# Patient Record
Sex: FEMALE | Race: WHITE | Employment: OTHER | URBAN - METROPOLITAN AREA
[De-identification: names, ages, dates, MRNs, and addresses within clinical notes are randomized per-mention and may not be internally consistent; named-entity substitution may affect disease eponyms.]

---

## 2017-10-18 PROBLEM — G89.4 CHRONIC PAIN SYNDROME: Status: ACTIVE | Noted: 2017-10-18

## 2018-05-23 ENCOUNTER — HOSPITAL ENCOUNTER (OUTPATIENT)
Age: 63
Setting detail: OUTPATIENT SURGERY
Discharge: HOME | End: 2018-05-23
Attending: ANESTHESIOLOGY | Admitting: ANESTHESIOLOGY
Payer: COMMERCIAL

## 2018-05-23 VITALS
OXYGEN SATURATION: 100 % | DIASTOLIC BLOOD PRESSURE: 60 MMHG | HEART RATE: 76 BPM | RESPIRATION RATE: 14 BRPM | HEIGHT: 64 IN | WEIGHT: 159 LBS | SYSTOLIC BLOOD PRESSURE: 100 MMHG | BODY MASS INDEX: 27.14 KG/M2

## 2018-05-23 DIAGNOSIS — R52 PAIN: ICD-10-CM

## 2018-05-23 PROBLEM — M96.1 LUMBAR POST-LAMINECTOMY SYNDROME: Chronic | Status: ACTIVE | Noted: 2018-05-23

## 2018-05-23 PROCEDURE — 7100000011 HC PHASE II RECOVERY - ADDTL 15 MIN: Performed by: ANESTHESIOLOGY

## 2018-05-23 PROCEDURE — 6370000000 HC RX 637 (ALT 250 FOR IP): Performed by: ANESTHESIOLOGY

## 2018-05-23 PROCEDURE — 3600000015 HC SURGERY LEVEL 5 ADDTL 15MIN: Performed by: ANESTHESIOLOGY

## 2018-05-23 PROCEDURE — 7100000010 HC PHASE II RECOVERY - FIRST 15 MIN: Performed by: ANESTHESIOLOGY

## 2018-05-23 PROCEDURE — 3600000005 HC SURGERY LEVEL 5 BASE: Performed by: ANESTHESIOLOGY

## 2018-05-23 PROCEDURE — 2580000003 HC RX 258: Performed by: ANESTHESIOLOGY

## 2018-05-23 PROCEDURE — 6360000002 HC RX W HCPCS: Performed by: ANESTHESIOLOGY

## 2018-05-23 PROCEDURE — 2500000003 HC RX 250 WO HCPCS: Performed by: ANESTHESIOLOGY

## 2018-05-23 RX ORDER — 0.9 % SODIUM CHLORIDE 0.9 %
VIAL (ML) INJECTION PRN
Status: DISCONTINUED | OUTPATIENT
Start: 2018-05-23 | End: 2018-05-23 | Stop reason: HOSPADM

## 2018-05-23 RX ORDER — SODIUM CHLORIDE, SODIUM LACTATE, POTASSIUM CHLORIDE, CALCIUM CHLORIDE 600; 310; 30; 20 MG/100ML; MG/100ML; MG/100ML; MG/100ML
INJECTION, SOLUTION INTRAVENOUS CONTINUOUS
Status: DISCONTINUED | OUTPATIENT
Start: 2018-05-23 | End: 2018-05-23 | Stop reason: HOSPADM

## 2018-05-23 RX ORDER — FENTANYL CITRATE 50 UG/ML
INJECTION, SOLUTION INTRAMUSCULAR; INTRAVENOUS PRN
Status: DISCONTINUED | OUTPATIENT
Start: 2018-05-23 | End: 2018-05-23 | Stop reason: HOSPADM

## 2018-05-23 RX ORDER — DIPHENHYDRAMINE HCL 25 MG
25 TABLET ORAL ONCE
Status: COMPLETED | OUTPATIENT
Start: 2018-05-23 | End: 2018-05-23

## 2018-05-23 RX ORDER — LIDOCAINE HYDROCHLORIDE 10 MG/ML
INJECTION, SOLUTION EPIDURAL; INFILTRATION; INTRACAUDAL; PERINEURAL PRN
Status: DISCONTINUED | OUTPATIENT
Start: 2018-05-23 | End: 2018-05-23 | Stop reason: HOSPADM

## 2018-05-23 RX ADMIN — SODIUM CHLORIDE, POTASSIUM CHLORIDE, SODIUM LACTATE AND CALCIUM CHLORIDE: 600; 310; 30; 20 INJECTION, SOLUTION INTRAVENOUS at 07:15

## 2018-05-23 RX ADMIN — DIPHENHYDRAMINE HCL 25 MG: 25 TABLET ORAL at 08:36

## 2018-05-23 ASSESSMENT — PAIN SCALES - GENERAL
PAINLEVEL_OUTOF10: 0

## 2018-05-23 ASSESSMENT — PAIN DESCRIPTION - DESCRIPTORS: DESCRIPTORS: ACHING

## 2018-05-23 ASSESSMENT — PAIN - FUNCTIONAL ASSESSMENT: PAIN_FUNCTIONAL_ASSESSMENT: 0-10

## 2018-07-26 RX ORDER — PREGABALIN 200 MG/1
75 CAPSULE ORAL 2 TIMES DAILY
COMMUNITY

## 2018-07-26 RX ORDER — TRAMADOL HYDROCHLORIDE 200 MG/1
200 CAPSULE ORAL DAILY
COMMUNITY

## 2018-07-26 RX ORDER — TRAMADOL HYDROCHLORIDE 50 MG/1
50 TABLET ORAL 2 TIMES DAILY PRN
COMMUNITY

## 2018-07-27 ENCOUNTER — ANESTHESIA EVENT (OUTPATIENT)
Dept: OPERATING ROOM | Age: 63
End: 2018-07-27
Payer: COMMERCIAL

## 2018-07-31 ASSESSMENT — LIFESTYLE VARIABLES: SMOKING_STATUS: 0

## 2018-07-31 NOTE — ANESTHESIA PRE PROCEDURE
Department of Anesthesiology  Preprocedure Note       Name:  Jefe Pyle   Age:  63 y.o.  :  1955                                          MRN:  [id number]         Date:  2018      Surgeon: Ej Bernard):  Juan Shah DO    Procedure: Procedure(s):  SURGICAL IMPLANTATION OF MEDTRONIC DRUG INFUSION PUMP    Medications prior to admission:   Prior to Admission medications    Medication Sig Start Date End Date Taking? Authorizing Provider   traMADol (ULTRAM) 50 MG tablet Take 50 mg by mouth 2 times daily as needed for Pain. .   Yes Historical Provider, MD   traMADol [employer] [facility]) 200 MG extended release tablet Take 200 mg by mouth daily. .   Yes Historical Provider, MD   pregabalin (LYRICA) 200 MG capsule Take 200 mg by mouth 2 times daily. .   Yes Historical Provider, MD   lidocaine (XYLOCAINE) 5 % ointment Apply topically as needed for pain up to 3 times a day to affected area. 18   Justin Claros MD   lidocaine (LIDODERM) 5 % APPLY three PATCH's TO THREE AFFECTED AREAS 12 HOURS ON 12 HOURS OFF. 18  Justin Claros MD   ketoconazole (NIZORAL) 2 % cream Apply topically 2 times daily Apply topically daily. Historical Provider, MD   Golimumab ([address]) 50 MG/0.5ML SOSY Inject into the skin monthy    Historical Provider, MD   Turmeric Curcumin 500 MG CAPS Take 1 capsule by mouth    Historical Provider, MD   MILK THISTLE PO Take 2 tablets by mouth daily    Historical Provider, MD   methotrexate (RHEUMATREX) 2.5 MG chemo tablet   Take 2.5 mg by mouth once a week Take 10 tabs on Friday nights total dose 20 mg. Historical Provider, MD   exemestane (AROMASIN) 25 MG chemo tablet Take 25 mg by mouth daily. Historical Provider, MD   folic acid (FOLVITE) 1 MG tablet Take 1 mg by mouth 2 times daily     Historical Provider, MD   magnesium gluconate (MAGONATE) 500 MG tablet Take 250 mg by mouth Takes 1-2 daily    Historical Provider, MD   Coenzyme Q10 (CO Q-10) 100 MG CAPS Take  by mouth daily. Mesh    TUBAL LIGATION      UPPER GASTROINTESTINAL ENDOSCOPY         Social History:    Social History   Substance Use Topics    Smoking status: Former Smoker     Years: 30.00     Types: Cigarettes     Quit date: 2/26/2004    Smokeless tobacco: Never Used    Alcohol use Yes      Comment: 1 glass red wine  4 days pe week                                Counseling given: Not Answered      Vital Signs (Current):   Vitals:    07/26/18 0956   Weight: 152 lb (68.9 kg)   Height: 5' 5\" (1.651 m)                                              BP Readings from Last 3 Encounters:   05/23/18 100/60   01/26/18 (!) 100/58   12/29/17 100/60       NPO Status:  >8.H                                                                               BMI:   Wt Readings from Last 3 Encounters:   05/23/18 159 lb (72.1 kg)   01/11/18 157 lb (71.2 kg)   11/06/17 162 lb (73.5 kg)     Body mass index is 25.29 kg/m². CBC:   Lab Results   Component Value Date    WBC 5.5 01/21/2015    RBC 3.69 01/21/2015    HGB 12.1 01/21/2015    HCT 35.8 01/21/2015    MCV 97.0 01/21/2015    RDW 13.5 01/21/2015     01/21/2015       CMP:   Lab Results   Component Value Date     01/21/2015    K 4.3 01/21/2015     01/21/2015    CO2 29 01/21/2015    BUN 17 04/25/2017    CREATININE 0.8 04/25/2017    GFRAA >60 04/25/2017    LABGLOM >60 04/25/2017    GLUCOSE 96 01/21/2015    GLUCOSE 91 03/20/2012    PROT 6.7 04/25/2017    CALCIUM 9.5 01/21/2015    BILITOT 0.4 04/25/2017    ALKPHOS 67 04/25/2017    AST 23 04/25/2017    ALT 17 04/25/2017       POC Tests: No results for input(s): POCGLU, POCNA, POCK, POCCL, POCBUN, POCHEMO, POCHCT in the last 72 hours.     Coags:   Lab Results   Component Value Date    PROTIME 13.6 03/20/2012    INR 1.2 03/20/2012    APTT 37.6 03/20/2012       HCG (If Applicable):   Lab Results   Component Value Date    PREGSERUM NEGATIVE 03/20/2012        ABGs: No results found for: PHART, PO2ART, JSI2LIM, HTS9JJU, BEART, G3BBKBQJ Type & Screen (If Applicable):  No results found for: [facility]    Anesthesia Evaluation  Patient summary reviewed history of anesthetic complications (\"anaphylaxis\"? with Demerol): Airway: Mallampati: II  TM distance: >3 FB   Neck ROM: full  Mouth opening: > = 3 FB Dental: normal exam     Comment: fillings    Pulmonary: breath sounds clear to auscultation      (-) not a current smoker (ex 30 yr smoker)                           Cardiovascular:  Exercise tolerance: good (>4 METS),         ECG reviewed  Rhythm: regular  Rate: normal    Stress test reviewed             ROS comment: Recent EKG=NSR    Stress test 2015=IMPRESSION:    1. Myocardial perfusion SPECT, within normal limits. 2. Stress induced ischemia is not demonstrated. Neuro/Psych:   (+) neuromuscular disease:, headaches: migraine headaches,             GI/Hepatic/Renal:   (+) PUD,           Endo/Other:    (+) hypothyroidism: arthritis: rheumatoid and OA., .                  ROS comment: CA tonsil, head and neck, skin, breast Abdominal:         (-) obese     Vascular:                                    Anesthesia Plan      general     ASA 3       Induction: intravenous. BIS  MIPS: Postoperative opioids intended and Prophylactic antiemetics administered. Anesthetic plan and risks discussed with patient. Plan discussed with CRNA.               Jojo Barron MD   7/31/2018

## 2018-08-01 ENCOUNTER — HOSPITAL ENCOUNTER (OUTPATIENT)
Dept: OPERATING ROOM | Age: 63
Setting detail: OUTPATIENT SURGERY
Discharge: HOME | End: 2018-08-01
Attending: ANESTHESIOLOGY
Payer: COMMERCIAL

## 2018-08-01 ENCOUNTER — HOSPITAL ENCOUNTER (OUTPATIENT)
Age: 63
Setting detail: OUTPATIENT SURGERY
Discharge: HOME | End: 2018-08-01
Attending: ANESTHESIOLOGY | Admitting: ANESTHESIOLOGY
Payer: COMMERCIAL

## 2018-08-01 ENCOUNTER — ANESTHESIA (OUTPATIENT)
Dept: OPERATING ROOM | Age: 63
End: 2018-08-01
Payer: COMMERCIAL

## 2018-08-01 VITALS
SYSTOLIC BLOOD PRESSURE: 134 MMHG | RESPIRATION RATE: 14 BRPM | HEIGHT: 65 IN | TEMPERATURE: 98 F | WEIGHT: 157 LBS | BODY MASS INDEX: 26.16 KG/M2 | HEART RATE: 64 BPM | OXYGEN SATURATION: 98 % | DIASTOLIC BLOOD PRESSURE: 64 MMHG

## 2018-08-01 VITALS
OXYGEN SATURATION: 99 % | DIASTOLIC BLOOD PRESSURE: 78 MMHG | SYSTOLIC BLOOD PRESSURE: 110 MMHG | RESPIRATION RATE: 12 BRPM

## 2018-08-01 DIAGNOSIS — M51.36 DDD (DEGENERATIVE DISC DISEASE), LUMBAR: Primary | Chronic | ICD-10-CM

## 2018-08-01 DIAGNOSIS — R52 PAIN: ICD-10-CM

## 2018-08-01 PROCEDURE — 7100000000 HC PACU RECOVERY - FIRST 15 MIN: Performed by: ANESTHESIOLOGY

## 2018-08-01 PROCEDURE — 3600000005 HC SURGERY LEVEL 5 BASE: Performed by: ANESTHESIOLOGY

## 2018-08-01 PROCEDURE — 2580000003 HC RX 258: Performed by: ANESTHESIOLOGY

## 2018-08-01 PROCEDURE — C1751 CATH, INF, PER/CENT/MIDLINE: HCPCS | Performed by: ANESTHESIOLOGY

## 2018-08-01 PROCEDURE — 6370000000 HC RX 637 (ALT 250 FOR IP): Performed by: ANESTHESIOLOGY

## 2018-08-01 PROCEDURE — 6360000002 HC RX W HCPCS: Performed by: ANESTHESIOLOGY

## 2018-08-01 PROCEDURE — 7100000001 HC PACU RECOVERY - ADDTL 15 MIN: Performed by: ANESTHESIOLOGY

## 2018-08-01 PROCEDURE — 3700000000 HC ANESTHESIA ATTENDED CARE: Performed by: ANESTHESIOLOGY

## 2018-08-01 PROCEDURE — 7100000011 HC PHASE II RECOVERY - ADDTL 15 MIN: Performed by: ANESTHESIOLOGY

## 2018-08-01 PROCEDURE — C1755 CATHETER, INTRASPINAL: HCPCS | Performed by: ANESTHESIOLOGY

## 2018-08-01 PROCEDURE — 2500000003 HC RX 250 WO HCPCS: Performed by: NURSE ANESTHETIST, CERTIFIED REGISTERED

## 2018-08-01 PROCEDURE — C1787 PATIENT PROGR, NEUROSTIM: HCPCS | Performed by: ANESTHESIOLOGY

## 2018-08-01 PROCEDURE — 7100000010 HC PHASE II RECOVERY - FIRST 15 MIN: Performed by: ANESTHESIOLOGY

## 2018-08-01 PROCEDURE — 3600000015 HC SURGERY LEVEL 5 ADDTL 15MIN: Performed by: ANESTHESIOLOGY

## 2018-08-01 PROCEDURE — 2500000003 HC RX 250 WO HCPCS: Performed by: ANESTHESIOLOGY

## 2018-08-01 PROCEDURE — 6360000002 HC RX W HCPCS: Performed by: NURSE ANESTHETIST, CERTIFIED REGISTERED

## 2018-08-01 PROCEDURE — C1772 INFUSION PUMP, PROGRAMMABLE: HCPCS | Performed by: ANESTHESIOLOGY

## 2018-08-01 PROCEDURE — 2709999900 HC NON-CHARGEABLE SUPPLY: Performed by: ANESTHESIOLOGY

## 2018-08-01 PROCEDURE — 3700000001 HC ADD 15 MINUTES (ANESTHESIA): Performed by: ANESTHESIOLOGY

## 2018-08-01 PROCEDURE — 3209999900 FLUORO FOR SURGICAL PROCEDURES

## 2018-08-01 PROCEDURE — L0450 TLSO FLEX TRUNK/THOR PRE OTS: HCPCS | Performed by: ANESTHESIOLOGY

## 2018-08-01 DEVICE — PUMP INFUS THK19.5MM DIA87.5MM 20ML TI PROGRAMMABLE: Type: IMPLANTABLE DEVICE | Site: BUTTOCKS | Status: FUNCTIONAL

## 2018-08-01 DEVICE — CATHETER ITH L86CM ASCENDA SPNL SEG: Type: IMPLANTABLE DEVICE | Site: BUTTOCKS | Status: FUNCTIONAL

## 2018-08-01 RX ORDER — LABETALOL HYDROCHLORIDE 5 MG/ML
5 INJECTION, SOLUTION INTRAVENOUS EVERY 10 MIN PRN
Status: DISCONTINUED | OUTPATIENT
Start: 2018-08-01 | End: 2018-08-01 | Stop reason: HOSPADM

## 2018-08-01 RX ORDER — MIDAZOLAM HYDROCHLORIDE 1 MG/ML
INJECTION INTRAMUSCULAR; INTRAVENOUS PRN
Status: DISCONTINUED | OUTPATIENT
Start: 2018-08-01 | End: 2018-08-01 | Stop reason: SDUPTHER

## 2018-08-01 RX ORDER — TRAMADOL HYDROCHLORIDE 50 MG/1
50 TABLET ORAL EVERY 4 HOURS PRN
Qty: 30 TABLET | Refills: 0 | Status: SHIPPED | OUTPATIENT
Start: 2018-08-01 | End: 2019-11-11

## 2018-08-01 RX ORDER — ONDANSETRON 2 MG/ML
INJECTION INTRAMUSCULAR; INTRAVENOUS PRN
Status: DISCONTINUED | OUTPATIENT
Start: 2018-08-01 | End: 2018-08-01 | Stop reason: SDUPTHER

## 2018-08-01 RX ORDER — FENTANYL CITRATE 50 UG/ML
50 INJECTION, SOLUTION INTRAMUSCULAR; INTRAVENOUS EVERY 5 MIN PRN
Status: DISCONTINUED | OUTPATIENT
Start: 2018-08-01 | End: 2018-08-01 | Stop reason: HOSPADM

## 2018-08-01 RX ORDER — FENTANYL CITRATE 50 UG/ML
INJECTION, SOLUTION INTRAMUSCULAR; INTRAVENOUS PRN
Status: DISCONTINUED | OUTPATIENT
Start: 2018-08-01 | End: 2018-08-01 | Stop reason: SDUPTHER

## 2018-08-01 RX ORDER — PROMETHAZINE HYDROCHLORIDE 25 MG/ML
25 INJECTION, SOLUTION INTRAMUSCULAR; INTRAVENOUS
Status: DISCONTINUED | OUTPATIENT
Start: 2018-08-01 | End: 2018-08-01 | Stop reason: HOSPADM

## 2018-08-01 RX ORDER — METOCLOPRAMIDE 10 MG/1
10 TABLET ORAL ONCE
Status: COMPLETED | OUTPATIENT
Start: 2018-08-01 | End: 2018-08-01

## 2018-08-01 RX ORDER — OXYCODONE HYDROCHLORIDE AND ACETAMINOPHEN 5; 325 MG/1; MG/1
2 TABLET ORAL EVERY 4 HOURS PRN
Status: DISCONTINUED | OUTPATIENT
Start: 2018-08-01 | End: 2018-08-01 | Stop reason: HOSPADM

## 2018-08-01 RX ORDER — SODIUM CHLORIDE, SODIUM LACTATE, POTASSIUM CHLORIDE, CALCIUM CHLORIDE 600; 310; 30; 20 MG/100ML; MG/100ML; MG/100ML; MG/100ML
INJECTION, SOLUTION INTRAVENOUS CONTINUOUS
Status: DISCONTINUED | OUTPATIENT
Start: 2018-08-01 | End: 2018-08-01 | Stop reason: HOSPADM

## 2018-08-01 RX ORDER — DEXAMETHASONE SODIUM PHOSPHATE 10 MG/ML
INJECTION, SOLUTION INTRAMUSCULAR; INTRAVENOUS PRN
Status: DISCONTINUED | OUTPATIENT
Start: 2018-08-01 | End: 2018-08-01 | Stop reason: SDUPTHER

## 2018-08-01 RX ORDER — GLYCOPYRROLATE 1 MG/5 ML
SYRINGE (ML) INTRAVENOUS PRN
Status: DISCONTINUED | OUTPATIENT
Start: 2018-08-01 | End: 2018-08-01 | Stop reason: SDUPTHER

## 2018-08-01 RX ORDER — NEOSTIGMINE METHYLSULFATE 1 MG/ML
INJECTION, SOLUTION INTRAVENOUS PRN
Status: DISCONTINUED | OUTPATIENT
Start: 2018-08-01 | End: 2018-08-01 | Stop reason: SDUPTHER

## 2018-08-01 RX ORDER — MORPHINE SULFATE 2 MG/ML
1 INJECTION, SOLUTION INTRAMUSCULAR; INTRAVENOUS EVERY 5 MIN PRN
Status: DISCONTINUED | OUTPATIENT
Start: 2018-08-01 | End: 2018-08-01 | Stop reason: HOSPADM

## 2018-08-01 RX ORDER — CEPHALEXIN 500 MG/1
500 CAPSULE ORAL 3 TIMES DAILY
Qty: 30 CAPSULE | Refills: 0 | Status: SHIPPED | OUTPATIENT
Start: 2018-08-01 | End: 2018-08-11

## 2018-08-01 RX ORDER — ROCURONIUM BROMIDE 10 MG/ML
INJECTION, SOLUTION INTRAVENOUS PRN
Status: DISCONTINUED | OUTPATIENT
Start: 2018-08-01 | End: 2018-08-01 | Stop reason: SDUPTHER

## 2018-08-01 RX ORDER — PROPOFOL 10 MG/ML
INJECTION, EMULSION INTRAVENOUS PRN
Status: DISCONTINUED | OUTPATIENT
Start: 2018-08-01 | End: 2018-08-01 | Stop reason: SDUPTHER

## 2018-08-01 RX ORDER — DIPHENHYDRAMINE HYDROCHLORIDE 50 MG/ML
12.5 INJECTION INTRAMUSCULAR; INTRAVENOUS
Status: DISCONTINUED | OUTPATIENT
Start: 2018-08-01 | End: 2018-08-01 | Stop reason: HOSPADM

## 2018-08-01 RX ORDER — HYDRALAZINE HYDROCHLORIDE 20 MG/ML
5 INJECTION INTRAMUSCULAR; INTRAVENOUS EVERY 10 MIN PRN
Status: DISCONTINUED | OUTPATIENT
Start: 2018-08-01 | End: 2018-08-01 | Stop reason: HOSPADM

## 2018-08-01 RX ORDER — LIDOCAINE HYDROCHLORIDE 20 MG/ML
INJECTION, SOLUTION INFILTRATION; PERINEURAL PRN
Status: DISCONTINUED | OUTPATIENT
Start: 2018-08-01 | End: 2018-08-01 | Stop reason: SDUPTHER

## 2018-08-01 RX ORDER — OXYCODONE HYDROCHLORIDE AND ACETAMINOPHEN 5; 325 MG/1; MG/1
1 TABLET ORAL EVERY 6 HOURS PRN
Qty: 30 TABLET | Refills: 0 | Status: SHIPPED | OUTPATIENT
Start: 2018-08-01 | End: 2018-08-01

## 2018-08-01 RX ADMIN — MIDAZOLAM 2 MG: 1 INJECTION INTRAMUSCULAR; INTRAVENOUS at 08:13

## 2018-08-01 RX ADMIN — PROPOFOL 150 MG: 10 INJECTION, EMULSION INTRAVENOUS at 08:16

## 2018-08-01 RX ADMIN — FENTANYL CITRATE 100 MCG: 50 INJECTION, SOLUTION INTRAMUSCULAR; INTRAVENOUS at 08:42

## 2018-08-01 RX ADMIN — DEXAMETHASONE SODIUM PHOSPHATE 10 MG: 10 INJECTION, SOLUTION INTRAMUSCULAR; INTRAVENOUS at 08:38

## 2018-08-01 RX ADMIN — SODIUM CHLORIDE, POTASSIUM CHLORIDE, SODIUM LACTATE AND CALCIUM CHLORIDE: 600; 310; 30; 20 INJECTION, SOLUTION INTRAVENOUS at 07:26

## 2018-08-01 RX ADMIN — CEFAZOLIN SODIUM 2 G: 2 SOLUTION INTRAVENOUS at 08:05

## 2018-08-01 RX ADMIN — FENTANYL CITRATE 50 MCG: 50 INJECTION, SOLUTION INTRAMUSCULAR; INTRAVENOUS at 08:15

## 2018-08-01 RX ADMIN — ROCURONIUM BROMIDE 30 MG: 10 INJECTION, SOLUTION INTRAVENOUS at 08:16

## 2018-08-01 RX ADMIN — ONDANSETRON HYDROCHLORIDE 4 MG: 2 INJECTION, SOLUTION INTRAMUSCULAR; INTRAVENOUS at 09:24

## 2018-08-01 RX ADMIN — SODIUM CHLORIDE, POTASSIUM CHLORIDE, SODIUM LACTATE AND CALCIUM CHLORIDE: 600; 310; 30; 20 INJECTION, SOLUTION INTRAVENOUS at 09:45

## 2018-08-01 RX ADMIN — ROCURONIUM BROMIDE 5 MG: 10 INJECTION, SOLUTION INTRAVENOUS at 09:05

## 2018-08-01 RX ADMIN — Medication 3 MG: at 09:54

## 2018-08-01 RX ADMIN — LIDOCAINE HYDROCHLORIDE 50 MG: 20 INJECTION, SOLUTION INFILTRATION; PERINEURAL at 08:16

## 2018-08-01 RX ADMIN — Medication 0.6 MG: at 09:54

## 2018-08-01 RX ADMIN — METOCLOPRAMIDE 10 MG: 10 TABLET ORAL at 07:26

## 2018-08-01 ASSESSMENT — PULMONARY FUNCTION TESTS
PIF_VALUE: 17
PIF_VALUE: 16
PIF_VALUE: 16
PIF_VALUE: 15
PIF_VALUE: 13
PIF_VALUE: 20
PIF_VALUE: 18
PIF_VALUE: 16
PIF_VALUE: 18
PIF_VALUE: 18
PIF_VALUE: 17
PIF_VALUE: 18
PIF_VALUE: 19
PIF_VALUE: 17
PIF_VALUE: 16
PIF_VALUE: 17
PIF_VALUE: 24
PIF_VALUE: 17
PIF_VALUE: 18
PIF_VALUE: 20
PIF_VALUE: 16
PIF_VALUE: 15
PIF_VALUE: 3
PIF_VALUE: 1
PIF_VALUE: 16
PIF_VALUE: 16
PIF_VALUE: 17
PIF_VALUE: 18
PIF_VALUE: 16
PIF_VALUE: 17
PIF_VALUE: 18
PIF_VALUE: 17
PIF_VALUE: 16
PIF_VALUE: 18
PIF_VALUE: 17
PIF_VALUE: 0
PIF_VALUE: 18
PIF_VALUE: 16
PIF_VALUE: 15
PIF_VALUE: 4
PIF_VALUE: 18
PIF_VALUE: 17
PIF_VALUE: 16
PIF_VALUE: 16
PIF_VALUE: 19
PIF_VALUE: 17
PIF_VALUE: 17
PIF_VALUE: 16
PIF_VALUE: 17
PIF_VALUE: 16
PIF_VALUE: 20
PIF_VALUE: 16
PIF_VALUE: 17
PIF_VALUE: 18
PIF_VALUE: 16
PIF_VALUE: 23
PIF_VALUE: 16
PIF_VALUE: 17
PIF_VALUE: 20
PIF_VALUE: 17
PIF_VALUE: 17
PIF_VALUE: 1
PIF_VALUE: 16
PIF_VALUE: 17
PIF_VALUE: 16
PIF_VALUE: 20
PIF_VALUE: 17
PIF_VALUE: 17
PIF_VALUE: 5
PIF_VALUE: 17
PIF_VALUE: 18
PIF_VALUE: 17
PIF_VALUE: 17
PIF_VALUE: 18
PIF_VALUE: 20
PIF_VALUE: 18
PIF_VALUE: 17
PIF_VALUE: 17
PIF_VALUE: 7
PIF_VALUE: 16
PIF_VALUE: 15
PIF_VALUE: 17
PIF_VALUE: 17
PIF_VALUE: 16
PIF_VALUE: 17
PIF_VALUE: 18
PIF_VALUE: 16
PIF_VALUE: 16
PIF_VALUE: 19
PIF_VALUE: 17
PIF_VALUE: 16
PIF_VALUE: 17
PIF_VALUE: 17
PIF_VALUE: 18
PIF_VALUE: 16
PIF_VALUE: 16
PIF_VALUE: 2
PIF_VALUE: 10
PIF_VALUE: 18
PIF_VALUE: 17
PIF_VALUE: 16
PIF_VALUE: 17
PIF_VALUE: 19

## 2018-08-01 ASSESSMENT — PAIN SCALES - GENERAL
PAINLEVEL_OUTOF10: 10
PAINLEVEL_OUTOF10: 0

## 2018-08-01 ASSESSMENT — PAIN - FUNCTIONAL ASSESSMENT: PAIN_FUNCTIONAL_ASSESSMENT: 0-10

## 2018-08-01 ASSESSMENT — PAIN DESCRIPTION - DESCRIPTORS: DESCRIPTORS: DISCOMFORT

## 2018-08-01 NOTE — ANESTHESIA POSTPROCEDURE EVALUATION
Department of Anesthesiology  Postprocedure Note    Patient: Ranjan Hicks  MRN: [id number]  YOB: 1955  Date of evaluation: 8/1/2018  Time:  1:13 PM     Procedure Summary     Date:  08/01/18 Room / Location:  [address] [place] [address] 04 / [facility]    Anesthesia Start:  0813 Anesthesia Stop:  0664    Procedure:  SURGICAL IMPLANTATION OF MEDTRONIC DRUG INFUSION PUMP (N/A ) Diagnosis:  (CHRONIC PAIN)    Surgeon:  Andrzej George DO Responsible Provider:  Mildred Goldberg MD    Anesthesia Type:  general ASA Status:  3          Anesthesia Type: general    Shahab Phase I: Shahab Score: 10    Shahab Phase II: Shahab Score: 10    Last vitals: Reviewed and per EMR flowsheets.        Anesthesia Post Evaluation    Patient location during evaluation: PACU  Patient participation: complete - patient participated  Level of consciousness: awake and alert  Airway patency: patent  Nausea & Vomiting: no nausea and no vomiting  Complications: no  Cardiovascular status: hemodynamically stable  Respiratory status: room air and spontaneous ventilation  Hydration status: stable

## 2018-08-01 NOTE — OP NOTE
[address]                                 OPERATIVE REPORT    PATIENT NAME: Chuckie Stern                      :        1955  MED REC NO:   [id number]                            ROOM:  ACCOUNT NO:   [de-identified]                           ADMIT DATE: 2018  PROVIDER:     Dc Baumann DO    DATE OF PROCEDURE:  2018    LOCATION:  [address] [place] [address], operating room #4, [facility]. SURGEON:  Dc Baumann DO    PREOPERATIVE DIAGNOSES:  Degenerative disk disease of lumbar spine, chronic  lumbar radiculopathy with neuropathic pain, chronic pain syndrome. POSTOPERATIVE DIAGNOSES:  Degenerative disk disease of lumbar spine,  chronic lumbar radiculopathy with neuropathic pain, chronic pain syndrome. PROCEDURES PERFORMED:  1. Surgical implantation of Medtronic subarachnoid pain pump generator. 2.  Surgical implantation of Medtronic subarachnoid pain pump catheter. 3.  Direct fluoroscopic guidance. 4.  Refill, analyze and reprogram pain pump in surgery with physician. COMPLICATIONS:  None. ASSISTANTS:  None. ESTIMATED BLOOD LOSS:  Less than 100 mL blood loss. ANESTHESIA:  General anesthesia. INDICATIONS:  The patient comes in today, 2018, to the [address] [place] [address] Operating Room #4, via our [address] [place] [address] for a pain  pump implantation. The patient has suffered with severe intractable back pain with radiation  from her back down her legs for years. She has exhausted conservative care  including oral medications, rehabilitation, physical therapy, therapeutic  injections, and subsequent surgical options have been exhausted. The patient passed a psychological screening profile and underwent a  successful pain pump trial, and presents today for a permanent pain pump  implant.     The patient discussed this at length including

## 2019-09-05 ENCOUNTER — HOSPITAL ENCOUNTER (OUTPATIENT)
Dept: MRI IMAGING | Age: 64
Discharge: HOME | End: 2019-09-07
Payer: COMMERCIAL

## 2019-09-05 DIAGNOSIS — M50.321 OTHER CERVICAL DISC DEGENERATION AT C4-C5 LEVEL: ICD-10-CM

## 2019-09-05 PROCEDURE — 72141 MRI NECK SPINE W/O DYE: CPT

## 2019-11-11 RX ORDER — KETOCONAZOLE 20 MG/G
CREAM TOPICAL PRN
COMMUNITY

## 2019-11-13 ENCOUNTER — HOSPITAL ENCOUNTER (OUTPATIENT)
Dept: OPERATING ROOM | Age: 64
Setting detail: OUTPATIENT SURGERY
Discharge: HOME | End: 2019-11-13
Attending: ANESTHESIOLOGY
Payer: COMMERCIAL

## 2019-11-13 ENCOUNTER — HOSPITAL ENCOUNTER (OUTPATIENT)
Age: 64
Setting detail: OUTPATIENT SURGERY
Discharge: HOME | End: 2019-11-13
Attending: ANESTHESIOLOGY | Admitting: ANESTHESIOLOGY
Payer: COMMERCIAL

## 2019-11-13 VITALS
SYSTOLIC BLOOD PRESSURE: 108 MMHG | DIASTOLIC BLOOD PRESSURE: 67 MMHG | OXYGEN SATURATION: 96 % | HEART RATE: 66 BPM | RESPIRATION RATE: 14 BRPM

## 2019-11-13 DIAGNOSIS — M50.321 DEGENERATION OF INTERVERTEBRAL DISC AT C4-C5 LEVEL: Primary | ICD-10-CM

## 2019-11-13 DIAGNOSIS — M47.892 OTHER OSTEOARTHRITIS OF SPINE, CERVICAL REGION: ICD-10-CM

## 2019-11-13 PROCEDURE — 3209999900 FLUORO FOR SURGICAL PROCEDURES

## 2019-11-13 PROCEDURE — 7100000010 HC PHASE II RECOVERY - FIRST 15 MIN: Performed by: ANESTHESIOLOGY

## 2019-11-13 PROCEDURE — 6360000002 HC RX W HCPCS: Performed by: ANESTHESIOLOGY

## 2019-11-13 PROCEDURE — 3600000005 HC SURGERY LEVEL 5 BASE: Performed by: ANESTHESIOLOGY

## 2019-11-13 PROCEDURE — 2709999900 HC NON-CHARGEABLE SUPPLY: Performed by: ANESTHESIOLOGY

## 2019-11-13 PROCEDURE — 2500000003 HC RX 250 WO HCPCS: Performed by: ANESTHESIOLOGY

## 2019-11-13 RX ORDER — LIDOCAINE HYDROCHLORIDE 10 MG/ML
INJECTION, SOLUTION EPIDURAL; INFILTRATION; INTRACAUDAL; PERINEURAL PRN
Status: DISCONTINUED | OUTPATIENT
Start: 2019-11-13 | End: 2019-11-13 | Stop reason: ALTCHOICE

## 2019-11-13 ASSESSMENT — PAIN - FUNCTIONAL ASSESSMENT: PAIN_FUNCTIONAL_ASSESSMENT: 0-10

## 2019-11-13 ASSESSMENT — PAIN SCALES - GENERAL
PAINLEVEL_OUTOF10: 0
PAINLEVEL_OUTOF10: 0

## 2019-11-20 ENCOUNTER — INITIAL CONSULT (OUTPATIENT)
Dept: NEUROSURGERY | Age: 64
End: 2019-11-20
Payer: COMMERCIAL

## 2019-11-20 VITALS
DIASTOLIC BLOOD PRESSURE: 78 MMHG | HEART RATE: 66 BPM | WEIGHT: 167 LBS | SYSTOLIC BLOOD PRESSURE: 119 MMHG | HEIGHT: 65 IN | BODY MASS INDEX: 27.82 KG/M2

## 2019-11-20 DIAGNOSIS — M25.78 DEGENERATION OF INTERVERTEBRAL DISC OF CERVICAL REGION WITH OSTEOPHYTE OF CERVICAL VERTEBRA: Primary | ICD-10-CM

## 2019-11-20 DIAGNOSIS — M50.30 DEGENERATION OF INTERVERTEBRAL DISC OF CERVICAL REGION WITH OSTEOPHYTE OF CERVICAL VERTEBRA: Primary | ICD-10-CM

## 2019-11-20 PROCEDURE — G8419 CALC BMI OUT NRM PARAM NOF/U: HCPCS | Performed by: NEUROLOGICAL SURGERY

## 2019-11-20 PROCEDURE — G8484 FLU IMMUNIZE NO ADMIN: HCPCS | Performed by: NEUROLOGICAL SURGERY

## 2019-11-20 PROCEDURE — G8427 DOCREV CUR MEDS BY ELIG CLIN: HCPCS | Performed by: NEUROLOGICAL SURGERY

## 2019-11-20 PROCEDURE — 99243 OFF/OP CNSLTJ NEW/EST LOW 30: CPT | Performed by: NEUROLOGICAL SURGERY

## 2019-11-20 ASSESSMENT — ENCOUNTER SYMPTOMS
ABDOMINAL PAIN: 0
SHORTNESS OF BREATH: 0
BACK PAIN: 1
PHOTOPHOBIA: 0
TROUBLE SWALLOWING: 0

## 2019-11-27 ENCOUNTER — HOSPITAL ENCOUNTER (OUTPATIENT)
Age: 64
Setting detail: OUTPATIENT SURGERY
Discharge: HOME | End: 2019-11-27
Attending: ANESTHESIOLOGY | Admitting: ANESTHESIOLOGY
Payer: COMMERCIAL

## 2019-11-27 ENCOUNTER — HOSPITAL ENCOUNTER (OUTPATIENT)
Dept: OPERATING ROOM | Age: 64
Setting detail: OUTPATIENT SURGERY
Discharge: HOME | End: 2019-11-27
Attending: ANESTHESIOLOGY
Payer: COMMERCIAL

## 2019-11-27 VITALS
SYSTOLIC BLOOD PRESSURE: 115 MMHG | HEART RATE: 77 BPM | DIASTOLIC BLOOD PRESSURE: 44 MMHG | RESPIRATION RATE: 16 BRPM | OXYGEN SATURATION: 95 %

## 2019-11-27 DIAGNOSIS — M47.892 OTHER OSTEOARTHRITIS OF SPINE, CERVICAL REGION: ICD-10-CM

## 2019-11-27 DIAGNOSIS — M50.30 DDD (DEGENERATIVE DISC DISEASE), CERVICAL: Primary | Chronic | ICD-10-CM

## 2019-11-27 PROBLEM — M47.812 CERVICAL SPONDYLOSIS: Chronic | Status: ACTIVE | Noted: 2019-11-27

## 2019-11-27 PROCEDURE — 2500000003 HC RX 250 WO HCPCS: Performed by: ANESTHESIOLOGY

## 2019-11-27 PROCEDURE — 7100000011 HC PHASE II RECOVERY - ADDTL 15 MIN: Performed by: ANESTHESIOLOGY

## 2019-11-27 PROCEDURE — 3600000005 HC SURGERY LEVEL 5 BASE: Performed by: ANESTHESIOLOGY

## 2019-11-27 PROCEDURE — 3209999900 FLUORO FOR SURGICAL PROCEDURES

## 2019-11-27 PROCEDURE — 7100000010 HC PHASE II RECOVERY - FIRST 15 MIN: Performed by: ANESTHESIOLOGY

## 2019-11-27 PROCEDURE — 2709999900 HC NON-CHARGEABLE SUPPLY: Performed by: ANESTHESIOLOGY

## 2019-11-27 PROCEDURE — 6360000002 HC RX W HCPCS: Performed by: ANESTHESIOLOGY

## 2019-11-27 RX ORDER — LIDOCAINE HYDROCHLORIDE 10 MG/ML
INJECTION, SOLUTION EPIDURAL; INFILTRATION; INTRACAUDAL; PERINEURAL PRN
Status: DISCONTINUED | OUTPATIENT
Start: 2019-11-27 | End: 2019-11-27 | Stop reason: ALTCHOICE

## 2019-11-27 ASSESSMENT — PAIN SCALES - GENERAL
PAINLEVEL_OUTOF10: 0
PAINLEVEL_OUTOF10: 0

## 2019-11-27 ASSESSMENT — PAIN - FUNCTIONAL ASSESSMENT: PAIN_FUNCTIONAL_ASSESSMENT: 0-10

## 2019-12-12 ENCOUNTER — HOSPITAL ENCOUNTER (OUTPATIENT)
Dept: MRI IMAGING | Age: 64
Discharge: HOME | End: 2019-12-14
Payer: COMMERCIAL

## 2019-12-12 DIAGNOSIS — M51.24 DISPLACEMENT OF THORACIC INTERVERTEBRAL DISC WITHOUT MYELOPATHY: ICD-10-CM

## 2019-12-12 PROCEDURE — 72146 MRI CHEST SPINE W/O DYE: CPT

## 2020-09-01 ENCOUNTER — HOSPITAL ENCOUNTER (OUTPATIENT)
Dept: MRI IMAGING | Age: 65
Discharge: HOME | End: 2020-09-03
Payer: COMMERCIAL

## 2020-09-01 ENCOUNTER — HOSPITAL ENCOUNTER (OUTPATIENT)
Age: 65
Discharge: HOME | End: 2020-09-03
Payer: COMMERCIAL

## 2020-09-01 ENCOUNTER — HOSPITAL ENCOUNTER (OUTPATIENT)
Dept: GENERAL RADIOLOGY | Age: 65
Discharge: HOME | End: 2020-09-03
Payer: COMMERCIAL

## 2020-09-01 ENCOUNTER — HOSPITAL ENCOUNTER (OUTPATIENT)
Dept: GENERAL RADIOLOGY | Age: 65
End: 2020-09-01
Payer: COMMERCIAL

## 2020-09-01 PROCEDURE — 72148 MRI LUMBAR SPINE W/O DYE: CPT

## 2020-09-01 PROCEDURE — 72040 X-RAY EXAM NECK SPINE 2-3 VW: CPT

## 2020-09-01 PROCEDURE — 72100 X-RAY EXAM L-S SPINE 2/3 VWS: CPT

## 2021-10-07 ENCOUNTER — HOSPITAL ENCOUNTER (OUTPATIENT)
Dept: CT IMAGING | Age: 66
Discharge: HOME | End: 2021-10-07
Payer: MEDICARE

## 2021-10-07 ENCOUNTER — HOSPITAL ENCOUNTER (OUTPATIENT)
Dept: MRI IMAGING | Age: 66
Discharge: HOME | End: 2021-10-09
Payer: MEDICARE

## 2021-10-07 DIAGNOSIS — K57.32 DIVERTICULITIS LARGE INTESTINE W/O PERFORATION OR ABSCESS W/O BLEEDING: ICD-10-CM

## 2021-10-07 DIAGNOSIS — M51.24 DISPLACEMENT OF THORACIC INTERVERTEBRAL DISC: ICD-10-CM

## 2021-10-07 LAB
BUN BLDV-MCNC: 21 MG/DL (ref 6–23)
CREAT SERPL-MCNC: 0.8 MG/DL (ref 0.5–1)
GFR AFRICAN AMERICAN: >60
GFR NON-AFRICAN AMERICAN: >60 ML/MIN/1.73

## 2021-10-07 PROCEDURE — 36415 COLL VENOUS BLD VENIPUNCTURE: CPT

## 2021-10-07 PROCEDURE — 82565 ASSAY OF CREATININE: CPT

## 2021-10-07 PROCEDURE — 84520 ASSAY OF UREA NITROGEN: CPT

## 2021-10-07 PROCEDURE — 74177 CT ABD & PELVIS W/CONTRAST: CPT

## 2021-10-07 PROCEDURE — 72146 MRI CHEST SPINE W/O DYE: CPT

## 2021-10-07 PROCEDURE — 6360000004 HC RX CONTRAST MEDICATION: Performed by: RADIOLOGY

## 2021-10-07 RX ADMIN — IOPAMIDOL 75 ML: 755 INJECTION, SOLUTION INTRAVENOUS at 14:23

## 2021-10-07 RX ADMIN — IOHEXOL 50 ML: 240 INJECTION, SOLUTION INTRATHECAL; INTRAVASCULAR; INTRAVENOUS; ORAL at 14:22

## 2022-03-31 RX ORDER — VITAMIN B COMPLEX
1 CAPSULE ORAL DAILY
COMMUNITY

## 2022-03-31 RX ORDER — GLUCOSAMINE/D3/BOSWELLIA SERRA 1500MG-400
1 TABLET ORAL DAILY
COMMUNITY

## 2022-04-06 ENCOUNTER — HOSPITAL ENCOUNTER (OUTPATIENT)
Age: 67
Setting detail: OUTPATIENT SURGERY
Discharge: HOME | End: 2022-04-06
Attending: ANESTHESIOLOGY | Admitting: ANESTHESIOLOGY
Payer: MEDICARE

## 2022-04-06 ENCOUNTER — HOSPITAL ENCOUNTER (OUTPATIENT)
Dept: OPERATING ROOM | Age: 67
Setting detail: OUTPATIENT SURGERY
Discharge: HOME | End: 2022-04-06
Attending: ANESTHESIOLOGY
Payer: MEDICARE

## 2022-04-06 VITALS
RESPIRATION RATE: 20 BRPM | BODY MASS INDEX: 28.49 KG/M2 | OXYGEN SATURATION: 96 % | TEMPERATURE: 97.6 F | WEIGHT: 171 LBS | DIASTOLIC BLOOD PRESSURE: 70 MMHG | HEART RATE: 63 BPM | SYSTOLIC BLOOD PRESSURE: 118 MMHG | HEIGHT: 65 IN

## 2022-04-06 DIAGNOSIS — M47.896 OTHER OSTEOARTHRITIS OF SPINE, LUMBAR REGION: ICD-10-CM

## 2022-04-06 PROCEDURE — 2500000003 HC RX 250 WO HCPCS: Performed by: ANESTHESIOLOGY

## 2022-04-06 PROCEDURE — 3209999900 FLUORO FOR SURGICAL PROCEDURES

## 2022-04-06 PROCEDURE — 7100000011 HC PHASE II RECOVERY - ADDTL 15 MIN: Performed by: ANESTHESIOLOGY

## 2022-04-06 PROCEDURE — 3600000005 HC SURGERY LEVEL 5 BASE: Performed by: ANESTHESIOLOGY

## 2022-04-06 PROCEDURE — 2709999900 HC NON-CHARGEABLE SUPPLY: Performed by: ANESTHESIOLOGY

## 2022-04-06 PROCEDURE — 3600000015 HC SURGERY LEVEL 5 ADDTL 15MIN: Performed by: ANESTHESIOLOGY

## 2022-04-06 PROCEDURE — 7100000010 HC PHASE II RECOVERY - FIRST 15 MIN: Performed by: ANESTHESIOLOGY

## 2022-04-06 RX ORDER — LIDOCAINE HYDROCHLORIDE 10 MG/ML
INJECTION, SOLUTION EPIDURAL; INFILTRATION; INTRACAUDAL; PERINEURAL PRN
Status: DISCONTINUED | OUTPATIENT
Start: 2022-04-06 | End: 2022-04-06 | Stop reason: ALTCHOICE

## 2022-04-06 ASSESSMENT — PAIN SCALES - GENERAL
PAINLEVEL_OUTOF10: 0
PAINLEVEL_OUTOF10: 0

## 2022-04-06 ASSESSMENT — PAIN - FUNCTIONAL ASSESSMENT
PAIN_FUNCTIONAL_ASSESSMENT: PREVENTS OR INTERFERES SOME ACTIVE ACTIVITIES AND ADLS
PAIN_FUNCTIONAL_ASSESSMENT: 0-10

## 2022-04-06 ASSESSMENT — PAIN DESCRIPTION - DESCRIPTORS: DESCRIPTORS: ACHING

## 2022-04-06 NOTE — OP NOTE
Maxi Jacinto    4/6/2022      Preoperative Diagnoses: Degenerative Osteoarthrosis with Facet Syndrome , Lumbar Spondylosis     Postoperative Diagnoses: Same     Procedure Performed: #1 Diagnostic Bilateral Lumbar facet block with medial branch nerve root block under direct fluoroscopic guidance      Anesthesia: Local.     Blood Loss:    None. Brief History:  Marissa comes today for the first  lumbar facet block with medial branch nerve root block. She was explained the details and potential complications of the procedure and requested we proceed. Her complete History & Physical examination was reviewed and it is unchanged. Description of Procedure:     Marissa was taken to the procedure room at The [address] [place] [address]  where, with the assistance of a nurse, she was placed on the fluoroscopy table in the prone position with a roll under the appropriate hip. The lumbar vertebral anatomy was then examined under fluoroscopy. An indelible marker was used to kalya the facet joints at the appropriate levels. Next the entire lumbar region was prepped and draped in the usual sterile manner. A time-out was performed and confirmed the patients name, date of birth, the procedure to be performed and skin marked for appropriate site and side of procedure. All questions and concerns were answered and the patient requested we proceed. A total of 5 ml. of 1% Lidocaine plain was utilized to obtain local anesthesia of the skin and underlying subcutaneous tissue via a #25-gauge 1.5-inch needle. The right  L5-S1 levels on the appropriate side a #22-gauge 5.0-inch spinal needle was inserted through the skin. It was then positioned under fluoroscopic guidance until it came to lie within the appropriate facet joint. A solution  consisting of 9.5 ml. of 0.25% Bupivacaine  was injected into the area of the facet joint and surrounding area. The needle was then repositioned to perform the medial branch nerve block.  An additional 1.5 ml. of solution was injected at that level. The needle was then withdrawn intact. The entire procedure was then repeated at the left  L5-S1 levels. Sterile Band-Aids were applied. Marissa was then transported to the recovery room and noted to have tolerated the procedure in satisfactory condition. Marissa has been given discharge instructions.       Electronically signed by Rigoberto Lennox, DO

## 2022-04-06 NOTE — H&P
Update History & Physical    The patient's History and Physical of 03/17/2022 was reviewed with the patient and there were no significant changes. I examined the patient and there were no significant changes from the previous History and Physical.    Plan: The risk, benefits, expected outcome, and alternative to the recommended procedure have been discussed with the patient. Patient understands and wants to proceed with the procedure.       Electronically signed by Tarah Russell DO on 4/6/22 at 7:41 AM EDT

## 2022-04-07 ENCOUNTER — OFFICE VISIT (OUTPATIENT)
Dept: RHEUMATOLOGY | Age: 67
End: 2022-04-07
Payer: MEDICARE

## 2022-04-07 ENCOUNTER — TELEPHONE (OUTPATIENT)
Dept: RHEUMATOLOGY | Age: 67
End: 2022-04-07

## 2022-04-07 VITALS
BODY MASS INDEX: 28.49 KG/M2 | HEIGHT: 65 IN | WEIGHT: 171 LBS | DIASTOLIC BLOOD PRESSURE: 68 MMHG | SYSTOLIC BLOOD PRESSURE: 120 MMHG | RESPIRATION RATE: 14 BRPM | HEART RATE: 75 BPM | OXYGEN SATURATION: 98 %

## 2022-04-07 DIAGNOSIS — M06.09 RHEUMATOID ARTHRITIS OF MULTIPLE SITES WITH NEGATIVE RHEUMATOID FACTOR (HCC): ICD-10-CM

## 2022-04-07 DIAGNOSIS — R74.8 ABNORMAL SERUM ACE LEVEL: ICD-10-CM

## 2022-04-07 DIAGNOSIS — Z13.6 ENCOUNTER FOR SCREENING FOR CARDIOVASCULAR DISORDERS: ICD-10-CM

## 2022-04-07 DIAGNOSIS — D84.9 IMMUNOSUPPRESSION (HCC): ICD-10-CM

## 2022-04-07 DIAGNOSIS — M54.50 CHRONIC BILATERAL LOW BACK PAIN WITHOUT SCIATICA: ICD-10-CM

## 2022-04-07 DIAGNOSIS — K21.9 GERD WITHOUT ESOPHAGITIS: ICD-10-CM

## 2022-04-07 DIAGNOSIS — G89.29 CHRONIC BILATERAL LOW BACK PAIN WITHOUT SCIATICA: ICD-10-CM

## 2022-04-07 DIAGNOSIS — M06.09 RHEUMATOID ARTHRITIS OF MULTIPLE SITES WITH NEGATIVE RHEUMATOID FACTOR (HCC): Primary | ICD-10-CM

## 2022-04-07 DIAGNOSIS — Z11.59 ENCOUNTER FOR SCREENING FOR OTHER VIRAL DISEASES: ICD-10-CM

## 2022-04-07 DIAGNOSIS — Z79.899 HIGH RISK MEDICATION USE: ICD-10-CM

## 2022-04-07 LAB
ALBUMIN SERPL-MCNC: 4.3 G/DL (ref 3.5–5.2)
ALP BLD-CCNC: 122 U/L (ref 35–104)
ALT SERPL-CCNC: 27 U/L (ref 0–32)
ANION GAP SERPL CALCULATED.3IONS-SCNC: 16 MMOL/L (ref 7–16)
AST SERPL-CCNC: 33 U/L (ref 0–31)
BASOPHILS ABSOLUTE: 0.05 E9/L (ref 0–0.2)
BASOPHILS RELATIVE PERCENT: 1.4 % (ref 0–2)
BILIRUB SERPL-MCNC: 0.3 MG/DL (ref 0–1.2)
BUN BLDV-MCNC: 16 MG/DL (ref 6–23)
C-REACTIVE PROTEIN: 0.3 MG/DL (ref 0–0.4)
CALCIUM SERPL-MCNC: 10.1 MG/DL (ref 8.6–10.2)
CHLORIDE BLD-SCNC: 100 MMOL/L (ref 98–107)
CHOLESTEROL, FASTING: 255 MG/DL (ref 0–199)
CO2: 26 MMOL/L (ref 22–29)
CREAT SERPL-MCNC: 0.7 MG/DL (ref 0.5–1)
EOSINOPHILS ABSOLUTE: 0.03 E9/L (ref 0.05–0.5)
EOSINOPHILS RELATIVE PERCENT: 0.9 % (ref 0–6)
GFR AFRICAN AMERICAN: >60
GFR NON-AFRICAN AMERICAN: >60 ML/MIN/1.73
GLUCOSE BLD-MCNC: 97 MG/DL (ref 74–99)
HCT VFR BLD CALC: 42.3 % (ref 34–48)
HDLC SERPL-MCNC: 48 MG/DL
HEMOGLOBIN: 13.5 G/DL (ref 11.5–15.5)
IMMATURE GRANULOCYTES #: 0.01 E9/L
IMMATURE GRANULOCYTES %: 0.3 % (ref 0–5)
LDL CHOLESTEROL CALCULATED: 164 MG/DL (ref 0–99)
LYMPHOCYTES ABSOLUTE: 0.72 E9/L (ref 1.5–4)
LYMPHOCYTES RELATIVE PERCENT: 20.5 % (ref 20–42)
MCH RBC QN AUTO: 30 PG (ref 26–35)
MCHC RBC AUTO-ENTMCNC: 31.9 % (ref 32–34.5)
MCV RBC AUTO: 94 FL (ref 80–99.9)
MONOCYTES ABSOLUTE: 0.44 E9/L (ref 0.1–0.95)
MONOCYTES RELATIVE PERCENT: 12.5 % (ref 2–12)
NEUTROPHILS ABSOLUTE: 2.27 E9/L (ref 1.8–7.3)
NEUTROPHILS RELATIVE PERCENT: 64.4 % (ref 43–80)
PDW BLD-RTO: 14.2 FL (ref 11.5–15)
PLATELET # BLD: 274 E9/L (ref 130–450)
PMV BLD AUTO: 10.1 FL (ref 7–12)
POTASSIUM SERPL-SCNC: 4.6 MMOL/L (ref 3.5–5)
RBC # BLD: 4.5 E12/L (ref 3.5–5.5)
SEDIMENTATION RATE, ERYTHROCYTE: 14 MM/HR (ref 0–20)
SODIUM BLD-SCNC: 142 MMOL/L (ref 132–146)
TOTAL CK: 138 U/L (ref 20–180)
TOTAL PROTEIN: 7.4 G/DL (ref 6.4–8.3)
TRIGLYCERIDE, FASTING: 213 MG/DL (ref 0–149)
VLDLC SERPL CALC-MCNC: 43 MG/DL
WBC # BLD: 3.5 E9/L (ref 4.5–11.5)

## 2022-04-07 PROCEDURE — 99205 OFFICE O/P NEW HI 60 MIN: CPT | Performed by: INTERNAL MEDICINE

## 2022-04-07 RX ORDER — UPADACITINIB 15 MG/1
15 TABLET, EXTENDED RELEASE ORAL DAILY
Qty: 30 TABLET | Refills: 3 | Status: SHIPPED
Start: 2022-04-07 | End: 2022-04-07 | Stop reason: SDUPTHER

## 2022-04-07 RX ORDER — UPADACITINIB 15 MG/1
15 TABLET, EXTENDED RELEASE ORAL DAILY
Qty: 90 TABLET | Refills: 1 | Status: SHIPPED
Start: 2022-04-07 | End: 2022-05-05

## 2022-04-07 ASSESSMENT — ENCOUNTER SYMPTOMS
TROUBLE SWALLOWING: 0
VOMITING: 0
SHORTNESS OF BREATH: 1
BACK PAIN: 1
COLOR CHANGE: 0
NAUSEA: 0
COUGH: 0
ABDOMINAL PAIN: 0
DIARRHEA: 0

## 2022-04-07 NOTE — TELEPHONE ENCOUNTER
Pt called and stated that the [facility]. She forgot to let Dr. Erma Magallon that at the appointment.

## 2022-04-07 NOTE — PROGRESS NOTES
Maxi Jacinto 1955 is a 61 y.o. female, here for evaluation of the following chief complaint(s):  New Patient (rhematoid arthritis )         ASSESSMENT/PLAN:    Maxi Jacinto 1955 is a 61 y.o. female seen in consult for rheumatoid arthritis. 1.  Seronegative rheumatoid arthritis-previous x-rays do not show erosive disease but will update. Nevertheless she does have synovitis on exam.  She is currently not on any medications. We will start her on Rinvoq. We discussed the risks, benefits, side effects. We will hold off on methotrexate for right now since she has some concerns and Rinvoq does just as well with monotherapy. We will get further work-up as below to get a better characterization of her disease. 2.  Immunosuppression-I recommend she stay up-to-date on vaccinations. She refuses the COVID vaccine. She is understanding and accepting of the increased risk of severe Covid infection on Rinvoq. In the event of any acute infectious illness she should hold Rinvoq. 3.  Medication monitoring-we will update TB and hepatitis. We will check lipid panel. Will need to monitor basic blood work every 3 months on [employer]. 4.  Elevated ACE level-she does have some shortness of breath. We will check a chest x-ray. 5.  GERD-with history of duodenal ulcer. On omeprazole. Avoid systemic NSAIDs. 6.  Chronic neck and back pain-rheumatoid arthritis spares the back and very rarely affects the AA joint in the neck. I suspect this is all a separate issue. She is following with pain management. She is on Lyrica. 1. Rheumatoid arthritis of multiple sites with negative rheumatoid factor (HCC)  -     XR HAND LEFT (MIN 3 VIEWS); Future  -     XR HAND RIGHT (MIN 3 VIEWS); Future  -     XR FOOT LEFT (MIN 3 VIEWS); Future  -     XR FOOT RIGHT (MIN 3 VIEWS); Future  -     XR KNEE LEFT (1-2 VIEWS); Future  -     XR KNEE RIGHT (1-2 VIEWS);  Future  -     CBC with Auto Differential; Future  - Comprehensive Metabolic Panel; Future  -     C-Reactive Protein; Future  -     Sedimentation Rate; Future  -     Hepatitis B Core Antibody, Total; Future  -     Hepatitis B Surface Antibody; Future  -     Hepatitis B Surface Antigen; Future  -     Hepatitis C Antibody; Future  -     T-Spot TB Test; Future  -     Miscellaneous Sendout; Future  -     Lipid, Fasting; Future  -     CK; Future  2. Abnormal serum ACE level  -     XR CHEST (2 VW); Future  -     CK; Future  3. Encounter for screening for other viral diseases   -     Hepatitis B Core Antibody, Total; Future  -     Hepatitis B Surface Antibody; Future  -     Hepatitis B Surface Antigen; Future  4. Encounter for screening for cardiovascular disorders   -     Lipid, Fasting; Future  5. Immunosuppression ([address]  6. High risk medication use  7. GERD without esophagitis  8. Chronic bilateral low back pain without sciatica      Return in about 4 months (around 8/7/2022). Subjective   SUBJECTIVE/OBJECTIVE:    HPIBalbir Black 1955 is a 61 y.o. female seen in consult for seronegative rheumatoid arthritis. Patient states that she was diagnosed 8 to 10 years ago. She has been on multiple medications in the past including Humira, Enbrel, Simponi which all worked well initially and then became less effective. Most recently she was on methotrexate and Genell Renetta. She recently switched over to Medicare and Genell Renetta became too expensive. She has been off of it for 6 to 8 weeks. Her sister-in-law was recently diagnosed with cancer so she was concerned with methotrexate and stopped that about a month ago. She was doing somewhat well on that regimen but feels like things are starting to flare back up. Her ankles are most bothersome. She does have some pain in the knees as well. Since being off of medication she is noticing a little more pain stiffness and swelling in the hands. Her elbows also give her some trouble.   She does have trouble with the CMC joints. She feels stiff for a couple hours in the morning. She has a lot of neck and back issues which are unrelated to her RA. She does get an erythematous rash on the upper outer thighs and upper arm. She has not had shawl rash heliotrope rash. She does complain of diffuse weakness but her muscle strength is good. She does complain of some shortness of breath. Recent blood work did show a slightly elevated ACE level. Past Medical History:   Diagnosis Date    Arthritis     Cancer [facility])     Head & Neck tonsil ca  skin cancer    Cancer ([facility] 2011    Breast, Left    Gastric peptic ulcer     History of cardiovascular stress test 1/2015    nuclear treadmill stress    Hypothyroid     Macular degeneration     Migraine     Osteoarthritis     [facility]         Review of Systems   Constitutional: Positive for fatigue. Negative for fever. HENT: Negative for mouth sores and trouble swallowing. Respiratory: Positive for shortness of breath. Negative for cough. Cardiovascular: Negative for chest pain. Gastrointestinal: Negative for abdominal pain, diarrhea, nausea and vomiting. Genitourinary: Negative for dysuria and hematuria. Musculoskeletal: Positive for arthralgias, back pain, joint swelling and neck pain. Skin: Positive for rash. Negative for color change. Neurological: Negative for weakness and numbness. Hematological: Negative for adenopathy. All other systems reviewed and are negative. Objective   Vitals:    04/07/22 1026   BP: 120/68   Pulse: 75   Resp: 14   SpO2: 98%      Physical Exam  Constitutional:       General: She is not in acute distress. Appearance: Normal appearance. HENT:      Head: Normocephalic and atraumatic. Right Ear: External ear normal.      Left Ear: External ear normal.      Nose: Nose normal.   Eyes:      General: No scleral icterus.   Pulmonary:      Effort: Pulmonary effort is normal.   Musculoskeletal: General: Swelling, tenderness and deformity present. Comments: She has Heberden's nodes and squaring of the first CMC joints bilaterally but does have synovitis in several MCP and PIP joints as well as the first IP joints bilaterally. Skin:     General: Skin is warm and dry. Findings: Rash present. Comments: Does have erythema on the upper outer thighs bilaterally. She has erythema on the upper arms as well. Neurological:      General: No focal deficit present. Mental Status: She is alert and oriented to person, place, and time. Mental status is at baseline. Comments: Muscle strength is 5 out of 5 throughout. Psychiatric:         Mood and Affect: Mood normal.         Behavior: Behavior normal.            Lab Results   Component Value Date    WBC 5.5 01/21/2015    HGB 12.1 01/21/2015    HCT 35.8 01/21/2015    MCV 97.0 01/21/2015     01/21/2015     Lab Results   Component Value Date     01/21/2015    K 4.3 01/21/2015     01/21/2015    CO2 29 01/21/2015    BUN 21 10/07/2021    CREATININE 0.8 10/07/2021    GLUCOSE 96 01/21/2015    CALCIUM 9.5 01/21/2015    PROT 6.7 04/25/2017    LABALBU 4.2 04/25/2017    BILITOT 0.4 04/25/2017    ALKPHOS 67 04/25/2017    AST 23 04/25/2017    ALT 17 04/25/2017    LABGLOM >60 10/07/2021    GFRAA >60 10/07/2021     No results found for: NATALEE  No results found for: RHEUMFACTOR  No results found for: SEDRATE  No results found for: CRP         An electronic signature was used to authenticate this note. This note was generated with a voice recognition dictation system. Please disregard any errors or omission which have escaped my review.     --Maya Simms,

## 2022-04-08 LAB
HBV SURFACE AB TITR SER: NORMAL {TITER}
HEPATITIS B SURFACE ANTIGEN INTERPRETATION: NORMAL
HEPATITIS C ANTIBODY INTERPRETATION: NORMAL

## 2022-04-09 LAB — HEPATITIS B CORE TOTAL ANTIBODY: NONREACTIVE

## 2022-04-11 LAB
COMMENT: NORMAL
REPORT: NORMAL

## 2022-04-20 LAB
Lab: NORMAL
REPORT: NORMAL
THIS TEST SENT TO: NORMAL

## 2022-04-21 ENCOUNTER — PATIENT MESSAGE (OUTPATIENT)
Dept: RHEUMATOLOGY | Age: 67
End: 2022-04-21

## 2022-04-21 NOTE — TELEPHONE ENCOUNTER
04/21/2022  Called [facility] and spoike with Pharmacist Renata, provided her verbal order for the Rinvoq medication. [facility] will process the rx within their computer network and they will contact our office if it requires a prior auth. Called the patient and gave her an update/run down of what I initiated in regards to the [address] [place] [address] being previously denied. I told patient we are starting over and med was called to [facility] and that she may receive a phone call from them. Patient was also advised that we will shred/disregard the patient assistant forms at this time. Patient was informed that if prior [facility] is required on the medication that we called in today, we will work on it accordingly, if denied, patient was informed to apply for a copay savings card. Patient noted understanding on the phone.       Electronically signed by Soo Perez MA on 4/21/2022 at 1:52 PM

## 2022-04-25 ENCOUNTER — HOSPITAL ENCOUNTER (OUTPATIENT)
Age: 67
Discharge: HOME | End: 2022-04-27
Payer: MEDICARE

## 2022-04-25 ENCOUNTER — HOSPITAL ENCOUNTER (OUTPATIENT)
Dept: GENERAL RADIOLOGY | Age: 67
Discharge: HOME | End: 2022-04-27
Payer: MEDICARE

## 2022-04-25 ENCOUNTER — HOSPITAL ENCOUNTER (OUTPATIENT)
Age: 67
End: 2022-04-25
Payer: MEDICARE

## 2022-04-25 DIAGNOSIS — M06.09 RHEUMATOID ARTHRITIS OF MULTIPLE SITES WITH NEGATIVE RHEUMATOID FACTOR (HCC): ICD-10-CM

## 2022-04-25 DIAGNOSIS — R74.8 ABNORMAL SERUM ACE LEVEL: ICD-10-CM

## 2022-04-25 PROCEDURE — 71046 X-RAY EXAM CHEST 2 VIEWS: CPT

## 2022-04-25 PROCEDURE — 73560 X-RAY EXAM OF KNEE 1 OR 2: CPT

## 2022-04-25 PROCEDURE — 73130 X-RAY EXAM OF HAND: CPT

## 2022-04-25 PROCEDURE — 73630 X-RAY EXAM OF FOOT: CPT

## 2022-04-25 NOTE — TELEPHONE ENCOUNTER
04/25/2022 Called [facility] this morning to check the status of the verbal Rinvoq order and if prior auth is required, [facility] states that they are still processing the medication and once they are completed they will let our office know if prior auth is needed.             Electronically signed by Elvera Gitelman, MA on 4/25/2022 at 10:55 AM

## 2022-04-28 RX ORDER — METHYLPREDNISOLONE 4 MG/1
TABLET ORAL
Qty: 1 KIT | Refills: 0 | Status: SHIPPED
Start: 2022-04-28 | End: 2022-04-28 | Stop reason: SDUPTHER

## 2022-05-05 ENCOUNTER — TELEPHONE (OUTPATIENT)
Dept: RHEUMATOLOGY | Age: 67
End: 2022-05-05

## 2022-05-11 ENCOUNTER — HOSPITAL ENCOUNTER (OUTPATIENT)
Age: 67
Setting detail: OUTPATIENT SURGERY
Discharge: HOME | End: 2022-05-11
Attending: ANESTHESIOLOGY | Admitting: ANESTHESIOLOGY
Payer: MEDICARE

## 2022-05-11 ENCOUNTER — HOSPITAL ENCOUNTER (OUTPATIENT)
Dept: OPERATING ROOM | Age: 67
Setting detail: OUTPATIENT SURGERY
Discharge: HOME | End: 2022-05-11
Attending: ANESTHESIOLOGY
Payer: MEDICARE

## 2022-05-11 VITALS
OXYGEN SATURATION: 98 % | TEMPERATURE: 98 F | RESPIRATION RATE: 16 BRPM | DIASTOLIC BLOOD PRESSURE: 70 MMHG | SYSTOLIC BLOOD PRESSURE: 105 MMHG | BODY MASS INDEX: 28.49 KG/M2 | WEIGHT: 171 LBS | HEIGHT: 65 IN | HEART RATE: 65 BPM

## 2022-05-11 DIAGNOSIS — M47.896 OTHER OSTEOARTHRITIS OF SPINE, LUMBAR REGION: ICD-10-CM

## 2022-05-11 PROCEDURE — 3209999900 FLUORO FOR SURGICAL PROCEDURES

## 2022-05-11 PROCEDURE — 7100000011 HC PHASE II RECOVERY - ADDTL 15 MIN: Performed by: ANESTHESIOLOGY

## 2022-05-11 PROCEDURE — 2500000003 HC RX 250 WO HCPCS: Performed by: ANESTHESIOLOGY

## 2022-05-11 PROCEDURE — 3600000005 HC SURGERY LEVEL 5 BASE: Performed by: ANESTHESIOLOGY

## 2022-05-11 PROCEDURE — 3600000015 HC SURGERY LEVEL 5 ADDTL 15MIN: Performed by: ANESTHESIOLOGY

## 2022-05-11 PROCEDURE — 7100000010 HC PHASE II RECOVERY - FIRST 15 MIN: Performed by: ANESTHESIOLOGY

## 2022-05-11 PROCEDURE — 2709999900 HC NON-CHARGEABLE SUPPLY: Performed by: ANESTHESIOLOGY

## 2022-05-11 RX ORDER — BUPIVACAINE HYDROCHLORIDE 2.5 MG/ML
INJECTION, SOLUTION EPIDURAL; INFILTRATION; INTRACAUDAL PRN
Status: DISCONTINUED | OUTPATIENT
Start: 2022-05-11 | End: 2022-05-11 | Stop reason: ALTCHOICE

## 2022-05-11 RX ORDER — LIDOCAINE HYDROCHLORIDE 10 MG/ML
INJECTION, SOLUTION EPIDURAL; INFILTRATION; INTRACAUDAL; PERINEURAL PRN
Status: DISCONTINUED | OUTPATIENT
Start: 2022-05-11 | End: 2022-05-11 | Stop reason: ALTCHOICE

## 2022-05-11 ASSESSMENT — PAIN DESCRIPTION - DESCRIPTORS: DESCRIPTORS: ACHING

## 2022-05-11 NOTE — H&P
Update History & Physical    The patient's History and Physical of 04/21/2022 was reviewed with the patient and there were no significant changes. I examined the patient and there were no significant changes from the previous History and Physical.    Plan: The risk, benefits, expected outcome, and alternative to the recommended procedure have been discussed with the patient. Patient understands and wants to proceed with the procedure.       Electronically signed by Gregg Harley DO on 5/11/22 at 9:08 AM EDT

## 2022-05-11 NOTE — OP NOTE
Saumya Sousa    5/11/2022      Preoperative Diagnoses: Degenerative Osteoarthrosis with Facet Syndrome , Lumbar Spondylosis     Postoperative Diagnoses: Same     Procedure Performed: #2 Diagnostic Bilateral Lumbar facet block with medial branch nerve root block under direct fluoroscopic guidance      Anesthesia: Local.     Blood Loss:    None. Brief History:  Marissa comes today for the second  lumbar facet block with medial branch nerve root block. She was explained the details and potential complications of the procedure and requested we proceed. Marissa states received 95% pain relief for 2 days, now 60% pain relief following last procedure. Her complete History & Physical examination was reviewed and it is unchanged. Description of Procedure:     Marissa was taken to the procedure room at The [address] [place] [address]  where, with the assistance of a nurse, she was placed on the fluoroscopy table in the prone position with a roll under the appropriate hip. The lumbar vertebral anatomy was then examined under fluoroscopy. An indelible marker was used to kayla the facet joints at the appropriate levels. Next the entire lumbar region was prepped and draped in the usual sterile manner. A time-out was performed and confirmed the patients name, date of birth, the procedure to be performed and skin marked for appropriate site and side of procedure. All questions and concerns were answered and the patient requested we proceed. A total of 5 ml. of 1% Lidocaine plain was utilized to obtain local anesthesia of the skin and underlying subcutaneous tissue via a #25-gauge 1.5-inch needle. The right L5-S1 levels on the appropriate side a #22-gauge 5.0-inch spinal needle was inserted through the skin. It was then positioned under fluoroscopic guidance until it came to lie within the appropriate facet joint.  A solution  consisting of 9.5 ml. of 0.25% Bupivacaine  was injected into the area of the facet joint and surrounding area. The needle was then repositioned to perform the medial branch nerve block. An additional 1.5 ml. of solution was injected at that level. The needle was then withdrawn intact. The entire procedure was then repeated at the left  L5-S1 levels. Sterile Band-Aids were applied. Marissa was then transported to the recovery room and noted to have tolerated the procedure in satisfactory condition. Marissa has been given discharge instructions.       Electronically signed by Candida Abraham DO

## 2022-05-20 ENCOUNTER — PATIENT MESSAGE (OUTPATIENT)
Dept: RHEUMATOLOGY | Age: 67
End: 2022-05-20

## 2022-05-20 RX ORDER — PREDNISONE 1 MG/1
TABLET ORAL
Qty: 90 TABLET | Refills: 1 | Status: SHIPPED
Start: 2022-05-20 | End: 2022-08-11 | Stop reason: SDUPTHER

## 2022-05-20 NOTE — TELEPHONE ENCOUNTER
From: Storm Quintana  To: Dr. Armando Downs: 5/20/2022 12:34 AM EDT  Subject: Extended steroid taper    Good evening, Dr. Bahena Has. I'm still working on obtaining co-pay assistance -- [email or web address] is telling me it will be $1695.15 per month!! Obviously that's not a doable number. But I am still sending in applications to any foundation I can find. In the meantime you had mentioned earlier that we could maybe try an extended steroid taper, if the delay continued. The shorter taper did help tremendously but as you can see the swelling is back, possibly even worse, in the weeks since I completed the first taper. The pain is pretty bad by the end of the day, making it difficult to walk, and I've been using an icebag to sleep. Do you think now would be a good time to do the longer taper or would it be better to wait as long as possible? (I'm assuming we can only do this once or twice). I don't know if this has any impact on the steroid issue but I also just received a couple of lumbar epidurals and have been scheduled for several thoracic epidurals to be completed probably in June. I'm sorry this is so long. I just wanted to be sure you had all the info you need (and then some. ..) to make a good decision. Thanks so much for your time!

## 2022-08-11 ENCOUNTER — OFFICE VISIT (OUTPATIENT)
Dept: RHEUMATOLOGY | Age: 67
End: 2022-08-11
Payer: MEDICARE

## 2022-08-11 VITALS
WEIGHT: 170 LBS | HEIGHT: 65 IN | OXYGEN SATURATION: 99 % | RESPIRATION RATE: 14 BRPM | SYSTOLIC BLOOD PRESSURE: 114 MMHG | HEART RATE: 70 BPM | BODY MASS INDEX: 28.32 KG/M2 | DIASTOLIC BLOOD PRESSURE: 68 MMHG

## 2022-08-11 DIAGNOSIS — M06.09 RHEUMATOID ARTHRITIS OF MULTIPLE SITES WITH NEGATIVE RHEUMATOID FACTOR (HCC): Primary | ICD-10-CM

## 2022-08-11 DIAGNOSIS — D84.9 IMMUNOSUPPRESSION (HCC): ICD-10-CM

## 2022-08-11 DIAGNOSIS — M85.872 OTHER SPECIFIED DISORDERS OF BONE DENSITY AND STRUCTURE, LEFT ANKLE AND FOOT: ICD-10-CM

## 2022-08-11 DIAGNOSIS — K21.9 GERD WITHOUT ESOPHAGITIS: ICD-10-CM

## 2022-08-11 DIAGNOSIS — Z79.899 HIGH RISK MEDICATION USE: ICD-10-CM

## 2022-08-11 DIAGNOSIS — M51.36 DDD (DEGENERATIVE DISC DISEASE), LUMBAR: Chronic | ICD-10-CM

## 2022-08-11 PROCEDURE — 99215 OFFICE O/P EST HI 40 MIN: CPT | Performed by: INTERNAL MEDICINE

## 2022-08-11 RX ORDER — PREDNISONE 1 MG/1
5 TABLET ORAL DAILY
Qty: 90 TABLET | Refills: 1 | Status: SHIPPED | OUTPATIENT
Start: 2022-08-11

## 2022-08-11 ASSESSMENT — ENCOUNTER SYMPTOMS
NAUSEA: 0
VOMITING: 0
TROUBLE SWALLOWING: 0
COLOR CHANGE: 0
ABDOMINAL PAIN: 0
COUGH: 0
SHORTNESS OF BREATH: 1
BACK PAIN: 1
DIARRHEA: 0

## 2022-08-11 NOTE — PROGRESS NOTES
Dominick Echols 1955 is a 61 y.o. female, here for evaluation of the following chief complaint(s):  Follow-up (RA- no concerns )         ASSESSMENT/PLAN:    Dominick Echols 1955 is a 61 y.o. female seen in follow-up for rheumatoid arthritis. 1.  Seronegative, erosive rheumatoid arthritis-last visit we started her on a prednisone taper and she is currently on 5 mg daily and actually doing quite well. The plan was also to start her on Rinvoq but her co-pay was very high and she was unable to qualify for any patient assistance until the beginning of next year. We will give her some samples today and continue 5 mg of prednisone daily. I do not see any obvious active synovitis on exam today. 2.  Immunosuppression-I recommend she stay up-to-date on vaccinations. She refuses the COVID vaccine. She is understanding and accepting of the increased risk of severe Covid infection on Rinvoq. In the event of any acute infectious illness she should hold Rinvoq. 3.  Medication monitoring-up-to-date on TB and hepatitis. We will update lipids at next visit. Will need to monitor basic blood work every 3 months on Rinvoq. We will update bone density scan. 4.  Elevated ACE level-she does have some shortness of breath. Chest x-ray is normal.    5.  GERD-with history of duodenal ulcer. On omeprazole. Avoid systemic NSAIDs. 6.  Chronic neck and back pain-rheumatoid arthritis spares the back and very rarely affects the AA joint in the neck. I suspect this is all a separate issue. She is following with pain management. She is on Lyrica. 1. Rheumatoid arthritis of multiple sites with negative rheumatoid factor (HCC)  -     DEXA BONE DENSITY 2 SITES; Future  2. Other specified disorders of bone density and structure, left ankle and foot   -     DEXA BONE DENSITY 2 SITES; Future  3. Immunosuppression ([address]  4. High risk medication use  5. GERD without esophagitis  6.  DDD (degenerative disc disease), lumbar L4-S1      Return in about 3 months (around 11/11/2022). Subjective   SUBJECTIVE/OBJECTIVE:    HPI: Samuel Bose 1955 is a 61 y.o. female seen in follow-up for seronegative rheumatoid arthritis. Last visit x-rays did show some subtle erosive disease. We started her on a prednisone taper which has worked quite well. She has tapered down to 5 mg daily and still doing well. She denies any significantly painful or swollen joints. The plan was to also start her on Rinvoq. Unfortunately her co-pay was too high and she does not qualify for any patient assistance until the beginning of next year. She did also have a fall since last visit and fractured her left arm. She has no extra-articular manifestations. Initial history: Patient states that she was diagnosed 8 to 10 years ago. She has been on multiple medications in the past including Humira, Enbrel, Simponi which all worked well initially and then became less effective. Most recently she was on methotrexate and Jake mawr. She recently switched over to Medicare and Jake mawr became too expensive. She has been off of it for 6 to 8 weeks. Her sister-in-law was recently diagnosed with cancer so she was concerned with methotrexate and stopped that about a month ago. She was doing somewhat well on that regimen but feels like things are starting to flare back up. Her ankles are most bothersome. She does have some pain in the knees as well. Since being off of medication she is noticing a little more pain stiffness and swelling in the hands. Her elbows also give her some trouble. She does have trouble with the [facility] [place] joints. She feels stiff for a couple hours in the morning. She has a lot of neck and back issues which are unrelated to her RA. She does get an erythematous rash on the upper outer thighs and upper arm. She has not had shawl rash heliotrope rash.   She does complain of diffuse weakness but her muscle strength is good.  She does complain of some shortness of breath. Recent blood work did show a slightly elevated ACE level. Past Medical History:   Diagnosis Date    Arthritis     Cancer [facility])     Head & Neck tonsil ca  skin cancer    Cancer ([facility] [address] 2011    Breast, Left    Gastric peptic ulcer     History of cardiovascular stress test 1/2015    nuclear treadmill stress    Hypothyroid     Macular degeneration     Migraine     Osteoarthritis     Rheumatoid arthritis ([facility] [address]         Review of Systems   Constitutional:  Positive for fatigue. Negative for fever. HENT:  Negative for mouth sores and trouble swallowing. Respiratory:  Positive for shortness of breath. Negative for cough. Cardiovascular:  Negative for chest pain. Gastrointestinal:  Negative for abdominal pain, diarrhea, nausea and vomiting. Genitourinary:  Negative for dysuria and hematuria. Musculoskeletal:  Positive for back pain and neck pain. Negative for arthralgias and joint swelling. Skin:  Negative for color change and rash. Neurological:  Negative for weakness and numbness. Hematological:  Negative for adenopathy. All other systems reviewed and are negative. Objective   Vitals:    08/11/22 1323   BP: 114/68   Pulse: 70   Resp: 14   SpO2: 99%      Physical Exam  Constitutional:       General: She is not in acute distress. Appearance: Normal appearance. HENT:      Head: Normocephalic and atraumatic. Right Ear: External ear normal.      Left Ear: External ear normal.      Nose: Nose normal.   Eyes:      General: No scleral icterus. Pulmonary:      Effort: Pulmonary effort is normal.   Musculoskeletal:         General: Deformity present. No swelling or tenderness. Comments: She has Heberden's nodes and squaring of the first CMC joints bilaterally but does not have any obvious active synovitis on exam today. Left arm and wrist is in a brace. Skin:     General: Skin is warm and dry. Findings: No rash. Neurological:      General: No focal deficit present. Mental Status: She is alert and oriented to person, place, and time. Mental status is at baseline. Comments: Muscle strength is 5 out of 5 throughout. Psychiatric:         Mood and Affect: Mood normal.         Behavior: Behavior normal.          Lab Results   Component Value Date    WBC 3.5 (L) 04/07/2022    HGB 13.5 04/07/2022    HCT 42.3 04/07/2022    MCV 94.0 04/07/2022     04/07/2022     Lab Results   Component Value Date     04/07/2022    K 4.6 04/07/2022     04/07/2022    CO2 26 04/07/2022    BUN 16 04/07/2022    CREATININE 0.7 04/07/2022    GLUCOSE 97 04/07/2022    CALCIUM 10.1 04/07/2022    PROT 7.4 04/07/2022    LABALBU 4.3 04/07/2022    BILITOT 0.3 04/07/2022    ALKPHOS 122 (H) 04/07/2022    AST 33 (H) 04/07/2022    ALT 27 04/07/2022    LABGLOM >60 04/07/2022    GFRAA >60 04/07/2022     No results found for: NATALEE  No results found for: RHEUMFACTOR  Lab Results   Component Value Date    SEDRATE 14 04/07/2022     Lab Results   Component Value Date    CRP 0.3 04/07/2022            An electronic signature was used to authenticate this note. This note was generated with a voice recognition dictation system. Please disregard any errors or omission which have escaped my review.     --Anup Grant, DO

## 2022-10-05 RX ORDER — UPADACITINIB 15 MG/1
15 TABLET, EXTENDED RELEASE ORAL DAILY
Qty: 30 TABLET | Refills: 3 | Status: SHIPPED | OUTPATIENT
Start: 2022-10-05

## 2022-11-16 ENCOUNTER — OFFICE VISIT (OUTPATIENT)
Dept: RHEUMATOLOGY | Age: 67
End: 2022-11-16
Payer: MEDICARE

## 2022-11-16 ENCOUNTER — TELEPHONE (OUTPATIENT)
Dept: RHEUMATOLOGY | Age: 67
End: 2022-11-16

## 2022-11-16 VITALS
BODY MASS INDEX: 28.82 KG/M2 | OXYGEN SATURATION: 99 % | SYSTOLIC BLOOD PRESSURE: 130 MMHG | HEIGHT: 65 IN | DIASTOLIC BLOOD PRESSURE: 84 MMHG | RESPIRATION RATE: 16 BRPM | HEART RATE: 70 BPM | WEIGHT: 173 LBS

## 2022-11-16 DIAGNOSIS — D84.9 IMMUNOSUPPRESSION (HCC): ICD-10-CM

## 2022-11-16 DIAGNOSIS — M06.09 RHEUMATOID ARTHRITIS OF MULTIPLE SITES WITH NEGATIVE RHEUMATOID FACTOR (HCC): ICD-10-CM

## 2022-11-16 DIAGNOSIS — Z79.899 HIGH RISK MEDICATION USE: ICD-10-CM

## 2022-11-16 DIAGNOSIS — M06.09 RHEUMATOID ARTHRITIS OF MULTIPLE SITES WITH NEGATIVE RHEUMATOID FACTOR (HCC): Primary | ICD-10-CM

## 2022-11-16 DIAGNOSIS — K21.9 GERD WITHOUT ESOPHAGITIS: ICD-10-CM

## 2022-11-16 DIAGNOSIS — M54.16 LUMBAR RADICULOPATHY: Chronic | ICD-10-CM

## 2022-11-16 LAB
ALBUMIN SERPL-MCNC: 4.6 G/DL (ref 3.5–5.2)
ALP BLD-CCNC: 82 U/L (ref 35–104)
ALT SERPL-CCNC: 28 U/L (ref 0–32)
ANION GAP SERPL CALCULATED.3IONS-SCNC: 14 MMOL/L (ref 7–16)
AST SERPL-CCNC: 37 U/L (ref 0–31)
BASOPHILS ABSOLUTE: 0.04 E9/L (ref 0–0.2)
BASOPHILS RELATIVE PERCENT: 1 % (ref 0–2)
BILIRUB SERPL-MCNC: 0.6 MG/DL (ref 0–1.2)
BUN BLDV-MCNC: 17 MG/DL (ref 6–23)
C-REACTIVE PROTEIN: 0.3 MG/DL (ref 0–0.4)
CALCIUM SERPL-MCNC: 9.4 MG/DL (ref 8.6–10.2)
CHLORIDE BLD-SCNC: 101 MMOL/L (ref 98–107)
CHOLESTEROL, FASTING: 290 MG/DL (ref 0–199)
CO2: 27 MMOL/L (ref 22–29)
CREAT SERPL-MCNC: 0.9 MG/DL (ref 0.5–1)
EOSINOPHILS ABSOLUTE: 0.04 E9/L (ref 0.05–0.5)
EOSINOPHILS RELATIVE PERCENT: 1 % (ref 0–6)
GFR SERPL CREATININE-BSD FRML MDRD: >60 ML/MIN/1.73
GLUCOSE BLD-MCNC: 71 MG/DL (ref 74–99)
HCT VFR BLD CALC: 40.3 % (ref 34–48)
HDLC SERPL-MCNC: 72 MG/DL
HEMOGLOBIN: 13.1 G/DL (ref 11.5–15.5)
IMMATURE GRANULOCYTES #: 0.02 E9/L
IMMATURE GRANULOCYTES %: 0.5 % (ref 0–5)
LDL CHOLESTEROL CALCULATED: 152 MG/DL (ref 0–99)
LYMPHOCYTES ABSOLUTE: 1.51 E9/L (ref 1.5–4)
LYMPHOCYTES RELATIVE PERCENT: 37.8 % (ref 20–42)
MCH RBC QN AUTO: 32 PG (ref 26–35)
MCHC RBC AUTO-ENTMCNC: 32.5 % (ref 32–34.5)
MCV RBC AUTO: 98.5 FL (ref 80–99.9)
MONOCYTES ABSOLUTE: 0.58 E9/L (ref 0.1–0.95)
MONOCYTES RELATIVE PERCENT: 14.5 % (ref 2–12)
NEUTROPHILS ABSOLUTE: 1.8 E9/L (ref 1.8–7.3)
NEUTROPHILS RELATIVE PERCENT: 45.2 % (ref 43–80)
PDW BLD-RTO: 14.4 FL (ref 11.5–15)
PLATELET # BLD: 297 E9/L (ref 130–450)
PMV BLD AUTO: 9.7 FL (ref 7–12)
POTASSIUM SERPL-SCNC: 3.8 MMOL/L (ref 3.5–5)
RBC # BLD: 4.09 E12/L (ref 3.5–5.5)
SODIUM BLD-SCNC: 142 MMOL/L (ref 132–146)
TOTAL PROTEIN: 7.1 G/DL (ref 6.4–8.3)
TRIGLYCERIDE, FASTING: 328 MG/DL (ref 0–149)
VLDLC SERPL CALC-MCNC: 66 MG/DL
WBC # BLD: 4 E9/L (ref 4.5–11.5)

## 2022-11-16 PROCEDURE — 99215 OFFICE O/P EST HI 40 MIN: CPT | Performed by: INTERNAL MEDICINE

## 2022-11-16 ASSESSMENT — ENCOUNTER SYMPTOMS
COUGH: 0
COLOR CHANGE: 0
TROUBLE SWALLOWING: 0
BACK PAIN: 1
VOMITING: 0
ABDOMINAL PAIN: 0
NAUSEA: 0
DIARRHEA: 0

## 2022-11-16 NOTE — PROGRESS NOTES
Anaya Weiss 1955 is a 61 y.o. female, here for evaluation of the following chief complaint(s):  Follow-up (Patient here for follow up on RA. )         ASSESSMENT/PLAN:    Anaya Weiss 1955 is a 61 y.o. female seen in follow-up for rheumatoid arthritis. 1.  Seronegative, erosive rheumatoid arthritis-doing very well on Rinvoq and 5 mg of prednisone daily. I do not see any obvious active synovitis on exam today. We will try decreasing prednisone to 2.5 mg daily for 1 month, then 2.5 mg every other day for 1 month, then stop. 2.  Immunosuppression-I recommend she stay up-to-date on vaccinations. She refuses the COVID vaccine. She is understanding and accepting of the increased risk of severe Covid infection on Rinvoq. In the event of any acute infectious illness she should hold Rinvoq. 3.  Medication monitoring- Will need to monitor basic blood work every 3 months on Rinvoq. We will update lipids. 4.  Elevated ACE level-she does have some shortness of breath. Chest x-ray is normal.    5.  GERD-with history of duodenal ulcer. On omeprazole. Avoid systemic NSAIDs. 6.  Chronic neck and back pain-rheumatoid arthritis spares the back and very rarely affects the AA joint in the neck. I suspect this is all a separate issue. She is following with pain management. She is on Lyrica. 1. Rheumatoid arthritis of multiple sites with negative rheumatoid factor (HCC)  -     Lipid, Fasting; Future  -     CBC with Auto Differential; Future  -     Comprehensive Metabolic Panel; Future  -     C-Reactive Protein; Future  -     Sedimentation Rate; Future  2. Immunosuppression (HCC)  -     Lipid, Fasting; Future  -     CBC with Auto Differential; Future  -     Comprehensive Metabolic Panel; Future  -     C-Reactive Protein; Future  -     Sedimentation Rate; Future  3. High risk medication use  -     Lipid, Fasting;  Future  -     CBC with Auto Differential; Future  -     Comprehensive Metabolic Panel; Future  -     C-Reactive Protein; Future  -     Sedimentation Rate; Future  4. GERD without esophagitis  5. Lumbar radiculopathy      Return in about 3 months (around 2/16/2023). Subjective   SUBJECTIVE/OBJECTIVE:    HPI: Jessica Mays 1955 is a 61 y.o. female seen in follow-up for seronegative rheumatoid arthritis. X-rays did show some subtle erosive disease. She is doing very well on Rinvoq and prednisone 5 mg daily. She denies any significantly painful or swollen joints. She does have fatigue. Her neck and back continue to be her biggest issue. She is following with pain management. She has no extra-articular manifestations. Initial history: Patient states that she was diagnosed 8 to 10 years ago. She has been on multiple medications in the past including Humira, Enbrel, Simponi which all worked well initially and then became less effective. Most recently she was on methotrexate and Druscilla Grooms. She recently switched over to Medicare and Druscilla Grooms became too expensive. She has been off of it for 6 to 8 weeks. Her sister-in-law was recently diagnosed with cancer so she was concerned with methotrexate and stopped that about a month ago. She was doing somewhat well on that regimen but feels like things are starting to flare back up. Her ankles are most bothersome. She does have some pain in the knees as well. Since being off of medication she is noticing a little more pain stiffness and swelling in the hands. Her elbows also give her some trouble. She does have trouble with the [facility] [place] joints. She feels stiff for a couple hours in the morning. She has a lot of neck and back issues which are unrelated to her RA. She does get an erythematous rash on the upper outer thighs and upper arm. She has not had shawl rash heliotrope rash. She does complain of diffuse weakness but her muscle strength is good. She does complain of some shortness of breath.   Recent blood work did show a slightly elevated ACE level. Past Medical History:   Diagnosis Date    Arthritis     Cancer [facility])     Head & Neck tonsil ca  skin cancer    Cancer ([facility] [address] 2011    Breast, Left    Gastric peptic ulcer     History of cardiovascular stress test 1/2015    nuclear treadmill stress    Hypothyroid     Macular degeneration     Migraine     Osteoarthritis     Rheumatoid arthritis ([facility] [address]         Review of Systems   Constitutional:  Positive for fatigue. Negative for fever. HENT:  Negative for mouth sores and trouble swallowing. Respiratory:  Negative for cough. Cardiovascular:  Negative for chest pain. Gastrointestinal:  Negative for abdominal pain, diarrhea, nausea and vomiting. Genitourinary:  Negative for dysuria and hematuria. Musculoskeletal:  Positive for back pain and neck pain. Negative for arthralgias and joint swelling. Skin:  Negative for color change and rash. Neurological:  Negative for weakness and numbness. Hematological:  Negative for adenopathy. All other systems reviewed and are negative. Objective   Vitals:    11/16/22 1516   BP: 130/84   Pulse: 70   Resp: 16   SpO2: 99%      Physical Exam  Constitutional:       General: She is not in acute distress. Appearance: Normal appearance. HENT:      Head: Normocephalic and atraumatic. Right Ear: External ear normal.      Left Ear: External ear normal.      Nose: Nose normal.   Eyes:      General: No scleral icterus. Pulmonary:      Effort: Pulmonary effort is normal.   Musculoskeletal:         General: Deformity present. No swelling or tenderness. Comments: She has Heberden's nodes and squaring of the first CMC joints bilaterally but does not have any obvious active synovitis on exam today. Skin:     General: Skin is warm and dry. Findings: No rash. Neurological:      General: No focal deficit present. Mental Status: She is alert and oriented to person, place, and time.  Mental status is at baseline. Psychiatric:         Mood and Affect: Mood normal.         Behavior: Behavior normal.          Lab Results   Component Value Date    WBC 3.5 (L) 04/07/2022    HGB 13.5 04/07/2022    HCT 42.3 04/07/2022    MCV 94.0 04/07/2022     04/07/2022     Lab Results   Component Value Date     04/07/2022    K 4.6 04/07/2022     04/07/2022    CO2 26 04/07/2022    BUN 16 04/07/2022    CREATININE 0.7 04/07/2022    GLUCOSE 97 04/07/2022    CALCIUM 10.1 04/07/2022    PROT 7.4 04/07/2022    LABALBU 4.3 04/07/2022    BILITOT 0.3 04/07/2022    ALKPHOS 122 (H) 04/07/2022    AST 33 (H) 04/07/2022    ALT 27 04/07/2022    LABGLOM >60 04/07/2022    GFRAA >60 04/07/2022     No results found for: NATALEE  No results found for: RHEUMFACTOR  Lab Results   Component Value Date    SEDRATE 14 04/07/2022     Lab Results   Component Value Date    CRP 0.3 04/07/2022            An electronic signature was used to authenticate this note. This note was generated with a voice recognition dictation system. Please disregard any errors or omission which have escaped my review.     --Stefania Tiwari, DO

## 2022-11-16 NOTE — PATIENT INSTRUCTIONS
Continue Rinvoq    Decrease prednisone to 1/2 tab daily for one month, then 1/2 tab every other day for one month, then stop

## 2022-11-16 NOTE — TELEPHONE ENCOUNTER
Patient's patient assistance application for Rinvoq has been approved. Approval notification scanned in chart. Patient is present for an appointment today and was verbally informed of the approval and advised to call [facility] Assist on Friday to setup delivery for medication.       Electronically signed by Donte Gabriel MA on 11/16/2022 at 3:23 PM

## 2022-11-17 LAB — SEDIMENTATION RATE, ERYTHROCYTE: 1 MM/HR (ref 0–20)

## 2023-01-17 RX ORDER — PREDNISONE 1 MG/1
TABLET ORAL
Qty: 90 TABLET | Refills: 3 | OUTPATIENT
Start: 2023-01-17

## 2023-03-07 ENCOUNTER — HOSPITAL ENCOUNTER (EMERGENCY)
Age: 68
Discharge: HOME | End: 2023-03-08
Attending: EMERGENCY MEDICINE
Payer: MEDICARE

## 2023-03-07 ENCOUNTER — APPOINTMENT (OUTPATIENT)
Dept: CT IMAGING | Age: 68
End: 2023-03-07
Payer: MEDICARE

## 2023-03-07 VITALS
TEMPERATURE: 99.1 F | SYSTOLIC BLOOD PRESSURE: 129 MMHG | BODY MASS INDEX: 27.79 KG/M2 | HEART RATE: 105 BPM | WEIGHT: 167 LBS | RESPIRATION RATE: 18 BRPM | OXYGEN SATURATION: 99 % | DIASTOLIC BLOOD PRESSURE: 92 MMHG

## 2023-03-07 DIAGNOSIS — K59.00 CONSTIPATION, UNSPECIFIED CONSTIPATION TYPE: ICD-10-CM

## 2023-03-07 DIAGNOSIS — R10.13 ABDOMINAL PAIN, EPIGASTRIC: Primary | ICD-10-CM

## 2023-03-07 LAB
ACANTHOCYTES: ABNORMAL
ALBUMIN SERPL-MCNC: 4.6 G/DL (ref 3.5–5.2)
ALP BLD-CCNC: 74 U/L (ref 35–104)
ALT SERPL-CCNC: 21 U/L (ref 0–32)
ANION GAP SERPL CALCULATED.3IONS-SCNC: 10 MMOL/L (ref 7–16)
AST SERPL-CCNC: 27 U/L (ref 0–31)
BASOPHILS ABSOLUTE: 0 E9/L (ref 0–0.2)
BASOPHILS RELATIVE PERCENT: 0.2 % (ref 0–2)
BILIRUB SERPL-MCNC: 0.7 MG/DL (ref 0–1.2)
BILIRUBIN URINE: NEGATIVE
BLOOD, URINE: NEGATIVE
BUN BLDV-MCNC: 15 MG/DL (ref 6–23)
CALCIUM SERPL-MCNC: 9.8 MG/DL (ref 8.6–10.2)
CHLORIDE BLD-SCNC: 102 MMOL/L (ref 98–107)
CLARITY: CLEAR
CO2: 26 MMOL/L (ref 22–29)
COLOR: YELLOW
CREAT SERPL-MCNC: 0.6 MG/DL (ref 0.5–1)
EOSINOPHILS ABSOLUTE: 0 E9/L (ref 0.05–0.5)
EOSINOPHILS RELATIVE PERCENT: 0 % (ref 0–6)
GFR SERPL CREATININE-BSD FRML MDRD: >60 ML/MIN/1.73
GLUCOSE BLD-MCNC: 121 MG/DL (ref 74–99)
GLUCOSE URINE: NEGATIVE MG/DL
HCT VFR BLD CALC: 39.5 % (ref 34–48)
HEMOGLOBIN: 13.7 G/DL (ref 11.5–15.5)
KETONES, URINE: NEGATIVE MG/DL
LACTIC ACID: 2.2 MMOL/L (ref 0.5–2.2)
LEUKOCYTE ESTERASE, URINE: NEGATIVE
LIPASE: 65 U/L (ref 13–60)
LYMPHOCYTES ABSOLUTE: 0.24 E9/L (ref 1.5–4)
LYMPHOCYTES RELATIVE PERCENT: 3.5 % (ref 20–42)
MCH RBC QN AUTO: 31.6 PG (ref 26–35)
MCHC RBC AUTO-ENTMCNC: 34.7 % (ref 32–34.5)
MCV RBC AUTO: 91.2 FL (ref 80–99.9)
MONOCYTES ABSOLUTE: 0.18 E9/L (ref 0.1–0.95)
MONOCYTES RELATIVE PERCENT: 2.6 % (ref 2–12)
NEUTROPHILS ABSOLUTE: 5.55 E9/L (ref 1.8–7.3)
NEUTROPHILS RELATIVE PERCENT: 93.9 % (ref 43–80)
NITRITE, URINE: NEGATIVE
PDW BLD-RTO: 12.8 FL (ref 11.5–15)
PH UA: 7.5 (ref 5–9)
PLATELET # BLD: 269 E9/L (ref 130–450)
PMV BLD AUTO: 9.5 FL (ref 7–12)
POIKILOCYTES: ABNORMAL
POLYCHROMASIA: ABNORMAL
POTASSIUM SERPL-SCNC: 4 MMOL/L (ref 3.5–5)
PROTEIN UA: NEGATIVE MG/DL
RBC # BLD: 4.33 E12/L (ref 3.5–5.5)
SODIUM BLD-SCNC: 138 MMOL/L (ref 132–146)
SPECIFIC GRAVITY UA: 1.01 (ref 1–1.03)
TARGET CELLS: ABNORMAL
TEAR DROP CELLS: ABNORMAL
TOTAL PROTEIN: 7.2 G/DL (ref 6.4–8.3)
TROPONIN, HIGH SENSITIVITY: 12 NG/L (ref 0–9)
UROBILINOGEN, URINE: 0.2 E.U./DL
WBC # BLD: 5.9 E9/L (ref 4.5–11.5)

## 2023-03-07 PROCEDURE — 81003 URINALYSIS AUTO W/O SCOPE: CPT

## 2023-03-07 PROCEDURE — 74177 CT ABD & PELVIS W/CONTRAST: CPT

## 2023-03-07 PROCEDURE — 96374 THER/PROPH/DIAG INJ IV PUSH: CPT

## 2023-03-07 PROCEDURE — 83690 ASSAY OF LIPASE: CPT

## 2023-03-07 PROCEDURE — 99285 EMERGENCY DEPT VISIT HI MDM: CPT

## 2023-03-07 PROCEDURE — 6360000004 HC RX CONTRAST MEDICATION: Performed by: RADIOLOGY

## 2023-03-07 PROCEDURE — 6360000002 HC RX W HCPCS: Performed by: STUDENT IN AN ORGANIZED HEALTH CARE EDUCATION/TRAINING PROGRAM

## 2023-03-07 PROCEDURE — 80053 COMPREHEN METABOLIC PANEL: CPT

## 2023-03-07 PROCEDURE — A4216 STERILE WATER/SALINE, 10 ML: HCPCS | Performed by: STUDENT IN AN ORGANIZED HEALTH CARE EDUCATION/TRAINING PROGRAM

## 2023-03-07 PROCEDURE — 84484 ASSAY OF TROPONIN QUANT: CPT

## 2023-03-07 PROCEDURE — 85025 COMPLETE CBC W/AUTO DIFF WBC: CPT

## 2023-03-07 PROCEDURE — 2500000003 HC RX 250 WO HCPCS: Performed by: STUDENT IN AN ORGANIZED HEALTH CARE EDUCATION/TRAINING PROGRAM

## 2023-03-07 PROCEDURE — 2580000003 HC RX 258: Performed by: STUDENT IN AN ORGANIZED HEALTH CARE EDUCATION/TRAINING PROGRAM

## 2023-03-07 PROCEDURE — 93005 ELECTROCARDIOGRAM TRACING: CPT | Performed by: STUDENT IN AN ORGANIZED HEALTH CARE EDUCATION/TRAINING PROGRAM

## 2023-03-07 PROCEDURE — 96375 TX/PRO/DX INJ NEW DRUG ADDON: CPT

## 2023-03-07 PROCEDURE — 83605 ASSAY OF LACTIC ACID: CPT

## 2023-03-07 RX ORDER — MORPHINE SULFATE 4 MG/ML
4 INJECTION, SOLUTION INTRAMUSCULAR; INTRAVENOUS ONCE
Status: COMPLETED | OUTPATIENT
Start: 2023-03-07 | End: 2023-03-07

## 2023-03-07 RX ORDER — 0.9 % SODIUM CHLORIDE 0.9 %
1000 INTRAVENOUS SOLUTION INTRAVENOUS ONCE
Status: COMPLETED | OUTPATIENT
Start: 2023-03-07 | End: 2023-03-08

## 2023-03-07 RX ORDER — ONDANSETRON 2 MG/ML
4 INJECTION INTRAMUSCULAR; INTRAVENOUS ONCE
Status: COMPLETED | OUTPATIENT
Start: 2023-03-07 | End: 2023-03-07

## 2023-03-07 RX ADMIN — ONDANSETRON 4 MG: 2 INJECTION INTRAMUSCULAR; INTRAVENOUS at 22:32

## 2023-03-07 RX ADMIN — IOPAMIDOL 75 ML: 755 INJECTION, SOLUTION INTRAVENOUS at 23:14

## 2023-03-07 RX ADMIN — FAMOTIDINE 20 MG: 10 INJECTION, SOLUTION INTRAVENOUS at 22:36

## 2023-03-07 RX ADMIN — SODIUM CHLORIDE 1000 ML: 9 INJECTION, SOLUTION INTRAVENOUS at 22:38

## 2023-03-07 RX ADMIN — MORPHINE SULFATE 4 MG: 4 INJECTION, SOLUTION INTRAMUSCULAR; INTRAVENOUS at 22:39

## 2023-03-07 ASSESSMENT — PAIN SCALES - GENERAL: PAINLEVEL_OUTOF10: 6

## 2023-03-07 ASSESSMENT — PAIN - FUNCTIONAL ASSESSMENT: PAIN_FUNCTIONAL_ASSESSMENT: 0-10

## 2023-03-07 ASSESSMENT — PAIN DESCRIPTION - ORIENTATION: ORIENTATION: LEFT;MID;UPPER

## 2023-03-07 ASSESSMENT — LIFESTYLE VARIABLES: HOW OFTEN DO YOU HAVE A DRINK CONTAINING ALCOHOL: 2-3 TIMES A WEEK

## 2023-03-07 ASSESSMENT — PAIN DESCRIPTION - LOCATION: LOCATION: ABDOMEN

## 2023-03-08 LAB
EKG ATRIAL RATE: 96 BPM
EKG P AXIS: 39 DEGREES
EKG P-R INTERVAL: 110 MS
EKG Q-T INTERVAL: 324 MS
EKG QRS DURATION: 64 MS
EKG QTC CALCULATION (BAZETT): 409 MS
EKG R AXIS: 20 DEGREES
EKG T AXIS: 27 DEGREES
EKG VENTRICULAR RATE: 96 BPM
TROPONIN, HIGH SENSITIVITY: 12 NG/L (ref 0–9)

## 2023-03-08 PROCEDURE — 36415 COLL VENOUS BLD VENIPUNCTURE: CPT

## 2023-03-08 PROCEDURE — 84484 ASSAY OF TROPONIN QUANT: CPT

## 2023-03-08 PROCEDURE — 93010 ELECTROCARDIOGRAM REPORT: CPT | Performed by: INTERNAL MEDICINE

## 2023-03-08 RX ORDER — DOCUSATE SODIUM 100 MG/1
100 CAPSULE, LIQUID FILLED ORAL 2 TIMES DAILY
Qty: 20 CAPSULE | Refills: 0 | Status: SHIPPED | OUTPATIENT
Start: 2023-03-08 | End: 2023-03-18

## 2023-03-08 NOTE — ED PROVIDER NOTES
[address]      Pt Name: Angelito Bennett  MRN: [id number]  [place] 1955  Date of evaluation: 3/7/2023  Provider: Fazal Barraza DO  PCP: Heaven Persaud MD  Note Started: 9:53 PM EST 3/7/23    CHIEF COMPLAINT       Chief Complaint   Patient presents with    Abdominal Pain     Diarrhea/ nausea hx gastric ulcer       HISTORY OF PRESENT ILLNESS: 1 or more Elements   History From: Patient  Limitations to history : None    Marissa Harrington is a 74 y.o. female who presents the ED due to epigastric abdominal pain. States that she was diagnosed with a gastric ulcer several years ago and has been on omeprazole ever since then. She has begun to have some mild epigastric pain over the last several weeks and was recently started on Carafate. States that she began taking it yesterday and started having severe abdominal pain this morning with associated nausea without any episodes of vomiting. Pain is mostly localized to the epigastric region with radiation down to the umbilicus. States that she had diarrhea yesterday and has been passing less gas this morning and not had a bowel movement since this morning as well. Denies any recent fever or chills. Nursing Notes were all reviewed and agreed with or any disagreements were addressed in the HPI. REVIEW OF SYSTEMS :    Positives and Pertinent negatives as per HPI.      SURGICAL HISTORY     Past Surgical History:   Procedure Laterality Date    ANESTHESIA NERVE BLOCK Right 11/13/2019    PARAVERTEBRAL FACET BLOCK CERVICAL RIGHT SIDE C3-4, C4-5 UNDER X-RAY performed by Rhett Obrien DO at [facility] Right 11/27/2019    PARAVERTEBRAL FACET BLOCK CERVICAL RIGHT SIDE C3-4, C4-5 UNDER X-RAY performed by Rhett Obrien DO at [facility]  08/26/2016    dilation and currettage    INSERT/ REPLACE INFUSN PUMP,PROGRAMMABLE N/A 8/1/2018    SURGICAL IMPLANTATION OF MEDTRONIC DRUG INFUSION PUMP performed by Francene Babinski, DO at [address] [facility]  Oct. 2003    Multiple, excisional biopsy neck    MASTECTOMY  10/2011    left    NERVE BLOCK  03-22-12    left transforaminal nerve block left cervical #1    NERVE BLOCK      TRANSFORAMINAL LEFT CERVICAL #2    NERVE BLOCK  04-11-12    transforaminal nerve block left cervical    NERVE BLOCK  11/28/12    left sacroiliac joint injection #1    NERVE BLOCK  12/5/12    left SI #2    NERVE BLOCK  12/12/12    left sacroiliac joint injection #3    NERVE BLOCK  T    RADIOFREQUENCY NEUROLYSIS MEDIAL BRANCH NERVE RIGHT CEVICAL C2-6    NERVE BLOCK  07-01-13    lumbar epidural #1    NERVE BLOCK N/A 7/10/13    lumbar epidural nerve block #2    NERVE BLOCK  7/24/13    lumbar epidural #3    NERVE BLOCK Left 1/6/14    left cervical radiofrequency c2-3,c3-4,c4-5    NERVE BLOCK Bilateral 2 16 15    si inj #1    NERVE BLOCK Bilateral 2/23/15    SI injection #2    NERVE BLOCK Bilateral 3/9/15    SI injection #3    NERVE BLOCK      right cervical radiofrequency    NERVE BLOCK Left 7/27/15    NERVE BLOCK Left 8 5 15    lumbar paravertebral facet #2    NERVE BLOCK Left 08 12 2015    paravertebral facet left lumbar #3    NERVE BLOCK N/A 03 03 2016    epidural lumbar #1    NERVE BLOCK N/A 3/21/16    thoracic epidural #2    NERVE BLOCK N/A 03 27 2016    Epidural thoracic #3    NERVE BLOCK Left 01/11/2017    transforaminal nerve block, cervical #1    NERVE BLOCK Left 01/25/2017    left cervical transforaminal nerve block #2    NERVE BLOCK Left 02/06/2017    cervical tfnb #3    NERVE BLOCK Right 11/13/2019    paravertebral facet cervical    NERVE BLOCK Right 11/27/2019    Right Paravertebral Facet Blockcervical C3-4, C4-5 under xray    NERVE BLOCK Bilateral 4/6/2022    L5-S1 BILATERAL LUMBAR PARAVERTEBRAL FACET NERVE BLOCK WITH X-RAY  ([id number]) performed by Francene Babinski, DO at [address] [facility] 1330 BLOCK Bilateral 5/11/2022    BILATERAL L5-S1 LUMBAR PARAVERTEBRAL FACET NERVE BLOCK UNDER X-RAY performed by Sonu Amezquita DO at [address]  [id number] medial branch nerve left cervical region c2-3, c3-4, c4-5    OTHER SURGICAL HISTORY  3 21 2012    discogram     OTHER SURGICAL HISTORY  03-11-13    radiofrequency left lateral sacral nerve S1-S3    OTHER SURGICAL HISTORY Right 11 25 13    cerv radiofreq    OTHER SURGICAL HISTORY Left 12/17/2014    left cervical radiofrequency     OTHER SURGICAL HISTORY  05/04/15    left lateral sacral nerve radiofrequency S1, S2, S3    OTHER SURGICAL HISTORY Right 6/24/2015    right lateral sacral nerve radiofrequency    OTHER SURGICAL HISTORY Left 10/12/2015    lumbar radiofrequency    OTHER SURGICAL HISTORY  12/2/15    radiofrequency right cervical    OTHER SURGICAL HISTORY Right 02/01/16    radiofrequency, lumbar    OTHER SURGICAL HISTORY Right 10/31/2016    cerical radiofrequency    OTHER SURGICAL HISTORY Left 12/05/2016    cervical radiofrequency    OTHER SURGICAL HISTORY Right 04/10/2017    lumbar radiofrequency    OTHER SURGICAL HISTORY Right 05/15/2017    cervical radiofrequency    OTHER SURGICAL HISTORY Left 07/19/2017    radiofrequency lumbar    OTHER SURGICAL HISTORY Left 09/11/2017    lateral sacral nerve radiofreq    OTHER SURGICAL HISTORY Right 11/06/2017    cervical radiofrequency    OTHER SURGICAL HISTORY  05/23/2018    stage 1 pump trial    OTHER SURGICAL HISTORY  08/01/2018    implantation of pain pump    NC OFFICE/OUTPT VISIT,PROCEDURE ONLY N/A 5/23/2018    STAGE 1 PUMP TRIAL performed by Sonu Amezquita DO at [facility]  Feb 2004    Radical Bilateral with Mesh    TUBAL LIGATION      UPPER GASTROINTESTINAL ENDOSCOPY         CURRENTMEDICATIONS       Discharge Medication List as of 3/8/2023  1:26 AM        CONTINUE these medications which have NOT CHANGED Details   NONFORMULARY Cholestoff - cholesterol supplement OTC, patient takes one tablet dailyHistorical Med      Multiple Vitamins-Minerals (PRESERVISION AREDS PO) Take by mouth in the morning and at bedtimeHistorical Med      Upadacitinib ER (RINVOQ) 15 MG TB24 Take 15 mg by mouth daily, Disp-30 tablet, R-3Normal      predniSONE (DELTASONE) 5 MG tablet Take 1 tablet by mouth in the morning., Disp-90 tablet, R-1Normal      Biotin 09135 MCG TABS Take 1 tablet by mouth dailyHistorical Med      b complex vitamins capsule Take 1 capsule by mouth dailyHistorical Med      Probiotic Product (PRO-BIOTIC BLEND PO) Take 1 tablet by mouth dailyHistorical Med      ketoconazole (NIZORAL) 2 % cream Apply topically as needed Apply topically daily. , Topical, PRN, Historical Med      traMADol (ULTRAM) 50 MG tablet Take 50 mg by mouth 2 times daily as needed for Pain (break through pain). Historical Med      traMADol (ULTRAM ER) 200 MG extended release tablet Take 200 mg by mouth daily. Vearl Pippins Historical Med      pregabalin (LYRICA) 200 MG capsule Take 75 mg by mouth 2 times daily. Historical Med      lidocaine (XYLOCAINE) 5 % ointment Apply topically as needed for pain up to 3 times a day to affected area., Disp-3 Tube, R-3, NO PRINT      lidocaine (LIDODERM) 5 % APPLY three PATCH's TO THREE AFFECTED AREAS 12 HOURS ON 12 HOURS OFF., Disp-90 patch, R-3ICD-10: M51.36, M54.5NO PRINT      Turmeric Curcumin 500 MG CAPS Take 1 capsule by mouth      MILK THISTLE PO Take 2 tablets by mouth daily      Coenzyme Q10 (CO Q-10) 100 MG CAPS Take  by mouth daily. Levothyroxine Sodium 75 MCG CAPS Take 50 mcg by mouth daily. acetaminophen (TYLENOL) 500 MG tablet Take 1,000 mg by mouth 4 times daily (before meals and nightly). omeprazole (PRILOSEC) 20 MG capsule Take 40 mg by mouth Daily       Multiple Vitamin (MULTIVITAMIN PO) Take 1 tablet by mouth daily. With dinnner      Sucralfate (CARAFATE PO) Take 1 g by mouth as needed.  When not taking other meds. ALLERGIES     Codeine, Demerol, Sulfa antibiotics, Aspirin, and Nsaids    FAMILYHISTORY       Family History   Problem Relation Age of Onset    Cancer Other         Sibling, Granparent, Other    High Blood Pressure Other         Parent    Heart Disease Other         Parent, Sibling, Other    Diabetes Other         Granparent, Other    High Cholesterol Other         Parent    Mental Illness Other         Grandparent        SOCIAL HISTORY       Social History     Tobacco Use    Smoking status: Former     Packs/day: 1.00     Years: 30.00     Pack years: 30.00     Types: Cigarettes     Quit date:      Years since quittin.1    Smokeless tobacco: Never   Substance Use Topics    Alcohol use: Yes     Comment: 1 glass red wine  4 days pe week    Drug use: No       SCREENINGS        [place] Coma Scale  Eye Opening: Spontaneous  Best Verbal Response: Oriented  Best Motor Response: Obeys commands  [place] Coma Scale Score: 15                CIWA Assessment  BP: (!) 129/92  Heart Rate: (!) 105  Nausea and Vomiting: intermittent nausea with dry heaves           PHYSICAL EXAM  1 or more Elements     ED Triage Vitals   BP Temp Temp src Heart Rate Resp SpO2 Height Weight   23 -- 23 -- 23 -- 23   (!) 129/92 99.1 °F (37.3 °C)  (!) 105  99 %  167 lb (75.8 kg)     Constitutional/General: Alert and oriented x3  Head: Normocephalic and atraumatic  Respiratory: Lungs clear to auscultation bilaterally, no wheezes, rales, or rhonchi. Not in respiratory distress  Cardiovascular:  Regular rate. Regular rhythm. No murmurs, no gallops, no rubs. 2+ distal pulses. Equal extremity pulses. Chest: No chest wall tenderness  GI: Epigastric abdominal tenderness. Soft and nondistended. .  +BS. No rebound, guarding, or rigidity. No pulsatile masses. Musculoskeletal: Moves all extremities x 4. Warm and well perfused, no clubbing, no cyanosis, no edema. Capillary refill <3 seconds  Integument: skin warm and dry. No rashes. Neurologic: GCS 15, no focal deficits    DIAGNOSTIC RESULTS   LABS:    Labs Reviewed   TROPONIN - Abnormal; Notable for the following components:       Result Value    Troponin, High Sensitivity 12 (*)     All other components within normal limits   CBC WITH AUTO DIFFERENTIAL - Abnormal; Notable for the following components:    MCHC 34.7 (*)     Neutrophils % 93.9 (*)     Lymphocytes % 3.5 (*)     Lymphocytes Absolute 0.24 (*)     Eosinophils Absolute 0.00 (*)     All other components within normal limits   COMPREHENSIVE METABOLIC PANEL - Abnormal; Notable for the following components:    Glucose 121 (*)     All other components within normal limits   LIPASE - Abnormal; Notable for the following components:    Lipase 65 (*)     All other components within normal limits   TROPONIN - Abnormal; Notable for the following components:    Troponin, High Sensitivity 12 (*)     All other components within normal limits   LACTIC ACID   URINALYSIS       As interpreted by me, the above displayed labs are abnormal. All other labs obtained during this visit were within normal range or not returned as of this dictation. EKG Interpretation:  Interpreted by emergency department physician, Kb Puckett, DO  Rate: 96  Rhythm: Normal sinus  Interpretation: non-specific EKG  Comparison: no previous EKG available    RADIOLOGY:   Non-plain film images such as CT, Ultrasound and MRI are read by the radiologist. Plain radiographic images are visualized and preliminarily interpreted by the ED Provider with the below findings:      Interpretation per the Radiologist below, if available at the time of this note:    CT ABDOMEN PELVIS W IV CONTRAST Additional Contrast? None   Final Result   Diffuse colonic fecal retention. Fluid distended stomach. Is           No results found. No results found.     PROCEDURES   Unless otherwise noted below, none     CRITICAL CARE TIME (.cct)   None    PAST MEDICAL HISTORY/Chronic Conditions Affecting Care    has a past medical history of Arthritis, Cancer ([facility] [address] Cancer ([address] (2011), Gastric peptic ulcer, History of cardiovascular stress test (1/2015), Hypothyroid, Macular degeneration, Migraine, Osteoarthritis, and Rheumatoid arthritis ([address] EMERGENCY DEPARTMENT COURSE    Vitals:    Vitals:    03/07/23 2128 03/07/23 2142 03/07/23 2306   BP:  (!) 129/92    Pulse: (!) 105     Resp:   18   Temp: 99.1 °F (37.3 °C)     SpO2: 99%     Weight:  167 lb (75.8 kg)        Patient was given the following medications:  Medications   0.9 % sodium chloride bolus (0 mLs IntraVENous Stopped 3/8/23 0024)   ondansetron (ZOFRAN) injection 4 mg (4 mg IntraVENous Given 3/7/23 2232)   famotidine (PEPCID) 20 mg in sodium chloride (PF) 0.9 % 10 mL injection (20 mg IntraVENous Given 3/7/23 2236)   morphine injection 4 mg (4 mg IntraVENous Given 3/7/23 2239)   iopamidol (ISOVUE-370) 76 % injection 75 mL (75 mLs IntraVENous Given 3/7/23 2314)         Is this patient to be included in the SEP-1 Core Measure due to severe sepsis or septic shock? No Exclusion criteria - the patient is NOT to be included for SEP-1 Core Measure due to:  Infection is not suspected      Medical Decision Making/Differential Diagnosis:    CC/HPI Summary, Social Determinants of health, Records Reviewed, DDx, testing done/not done, ED Course, Reassessment, disposition considerations/shared decision making with patient, consults, disposition:            Chronic Conditions:   Past Medical History:   Diagnosis Date    Arthritis     Cancer ([facility] [address]     Head & Neck tonsil ca  skin cancer    Cancer ([address] 2011    Breast, Left    Gastric peptic ulcer     History of cardiovascular stress test 1/2015    nuclear treadmill stress    Hypothyroid     Macular degeneration     Migraine     Osteoarthritis     Rheumatoid arthritis ([facility] [address]        CONSULTS: (Who and What was discussed)  None    Discussion with Other Profesionals : None    Social Determinants : None    Records Reviewed : Outpatient Notes office visit 12/22/2022    CC/HPI Summary, DDx, ED Course, and Reassessment: EKG is ordered to have documentation of patient's current rhythm, and to rule out any obvious acute cardiac illnesses such as ACS. Additionally, QT interval may be of use in decision making regarding any medications administered here in the ED. CBC is ordered to evaluate for any signs of infection or inflammation by obtaining a WBC count, or any signs of acute anemia by interpreting hemoglobin. CMP was ordered to evaluate for any electrolyte imbalances, kidney function, or any elevations in anion gap. Troponin ordered to evaluate for possible cardiac etiology of symptoms including but not limited to STEMI or NSTEMI. Urinalysis ordered to evaluate for UTI as the possible cause of symptoms, and may also evaluate hematuria as well. Lipase levels were ordered to evaluate for possible elevations which suggest pancreatic etiology of symptoms. Lactic acid levels were ordered to evaluate for signs of ischemia or decrease perfusion to organ systems. A CT abdomen with IV contrast was ordered to evaluate for, but without limitation, constipation, small bowel obstruction, bowel ischemia, pneumoperitoneum, diverticulitis, cholecystitis, appendicitis, perforation. Patient initially given morphine, Pepcid, Zofran and 1 L normal saline bolus. Patient CBC was markedly benign within normal limits. BMP was also within normal limits. Lactic acid was 2.2. Lipase was only slightly elevated to 65. Initial troponin was 12 with a repeat of 12. Urinalysis did not reveal any signs of infection. CT abdomen pelvis revealed diffuse colonic fecal retention with a fluid distended stomach. Patient reported moderate relief of her symptoms on reevaluation. She is instructed to continue MiraLAX at home and will be sent home with a prescription for Colace.   She is also instructed to keep her prescribed Carafate at this time. Patient was told to follow-up with her primary care provider regarding her symptoms if they persist.  She was instructed to return to the ED if symptoms return, worsen or change at any time. Disposition Considerations (Tests not ordered but considered, Shared Decision Making, Pt Expectation of Test or Tx.):     FINAL IMPRESSION      1. Abdominal pain, epigastric    2.  Constipation, unspecified constipation type          DISPOSITION/PLAN     DISPOSITION Decision To Discharge 03/08/2023 01:21:38 AM    PATIENT REFERRED TO:  MD Natanael Guerrero [place] [id number] [place] [address] [id number]    Schedule an appointment as soon as possible for a visit in 2 days  For follow up in 48 hours, sooner if worsening    DISCHARGE MEDICATIONS:  Discharge Medication List as of 3/8/2023  1:26 AM        START taking these medications    Details   docusate sodium (COLACE) 100 MG capsule Take 1 capsule by mouth 2 times daily for 10 days, Disp-20 capsule, R-0Normal             DISCONTINUED MEDICATIONS:  Discharge Medication List as of 3/8/2023  1:26 AM               (Please note that portions of this note were completed with a voice recognition program.  Efforts were made to edit the dictations but occasionally words are mis-transcribed.)    Ember Vazquez DO (electronically signed)       Ember Vazquez DO  Resident  03/08/23 0309

## 2023-04-24 ENCOUNTER — OFFICE VISIT (OUTPATIENT)
Dept: RHEUMATOLOGY | Age: 68
End: 2023-04-24
Payer: MEDICARE

## 2023-04-24 VITALS — HEIGHT: 65 IN | WEIGHT: 165 LBS | BODY MASS INDEX: 27.49 KG/M2

## 2023-04-24 DIAGNOSIS — Z79.899 HIGH RISK MEDICATION USE: ICD-10-CM

## 2023-04-24 DIAGNOSIS — M54.16 LUMBAR RADICULOPATHY: Chronic | ICD-10-CM

## 2023-04-24 DIAGNOSIS — D84.9 IMMUNOSUPPRESSION (HCC): ICD-10-CM

## 2023-04-24 DIAGNOSIS — R74.8 ABNORMAL SERUM ACE LEVEL: ICD-10-CM

## 2023-04-24 DIAGNOSIS — M06.09 RHEUMATOID ARTHRITIS OF MULTIPLE SITES WITH NEGATIVE RHEUMATOID FACTOR (HCC): Primary | ICD-10-CM

## 2023-04-24 DIAGNOSIS — K21.9 GERD WITHOUT ESOPHAGITIS: ICD-10-CM

## 2023-04-24 DIAGNOSIS — M06.09 RHEUMATOID ARTHRITIS OF MULTIPLE SITES WITH NEGATIVE RHEUMATOID FACTOR (HCC): ICD-10-CM

## 2023-04-24 LAB — CRP SERPL HS-MCNC: <0.3 MG/DL (ref 0–0.4)

## 2023-04-24 PROCEDURE — 1123F ACP DISCUSS/DSCN MKR DOCD: CPT | Performed by: INTERNAL MEDICINE

## 2023-04-24 PROCEDURE — 99215 OFFICE O/P EST HI 40 MIN: CPT | Performed by: INTERNAL MEDICINE

## 2023-04-24 ASSESSMENT — ENCOUNTER SYMPTOMS
VOMITING: 0
COUGH: 0
NAUSEA: 0
TROUBLE SWALLOWING: 0
COLOR CHANGE: 0
BACK PAIN: 1
DIARRHEA: 0
ABDOMINAL PAIN: 0

## 2023-04-24 NOTE — PROGRESS NOTES
Colton Dos Santos 1955 is a 74 y.o. female, here for evaluation of the following chief complaint(s):  Follow-up (Patient here for follow up on RA. )         ASSESSMENT/PLAN:    Colton Dos Santos 1955 is a 74 y.o. female seen in follow-up for rheumatoid arthritis. 1.  Seronegative, erosive rheumatoid arthritis-doing pretty well on Rinvoq. Last visit we were able to taper her off of prednisone. However she has been having pain and swelling in both ankles right greater than left. We will update x-rays today and inflammatory markers. If everything is normal we will have her see podiatry. If inflammatory markers are elevated or it looks like there is progression on x-ray we did discuss the possibility of adding back methotrexate which she is somewhat hesitant to do versus [address] which would be the more likely scenario. 2.  Immunosuppression-I recommend she stay up-to-date on vaccinations. She refuses the COVID vaccine. She is understanding and accepting of the increased risk of severe Covid infection on Rinvoq. In the event of any acute infectious illness she should hold Rinvoq. 3.  High risk medication use- Will need to monitor basic blood work every 3 months on Rinvoq. PCP is following her cholesterol. We will monitor for infection. 4.  Elevated ACE level-she does have some shortness of breath. Chest x-ray is normal.    5.  GERD-with history of duodenal ulcer. On omeprazole. Avoid systemic NSAIDs. She is unable to tolerate topical NSAIDs either. 6.  Chronic neck and back pain-rheumatoid arthritis spares the back and very rarely affects the AA joint in the neck. I suspect this is all a separate issue. She is following with pain management. She is on Lyrica. 1. Rheumatoid arthritis of multiple sites with negative rheumatoid factor (HCC)  -     C-Reactive Protein; Future  -     Sedimentation Rate; Future  -     XR HAND LEFT (MIN 3 VIEWS);  Future  -     XR HAND RIGHT (MIN 3 Nurse handoff report given to El. Johanna paused for transport.

## 2023-04-24 NOTE — PATIENT INSTRUCTIONS
No medication changes    Have Xrays    Pending blood work and Xrays may consider adding back methotrexate vs Arava vs seeing Podiatry

## 2023-04-25 LAB — ERYTHROCYTE [SEDIMENTATION RATE] IN BLOOD BY WESTERGREN METHOD: 2 MM/HR (ref 0–20)

## 2023-05-09 DIAGNOSIS — M51.26 OTHER INTERVERTEBRAL DISC DISPLACEMENT, LUMBAR REGION: ICD-10-CM

## 2023-05-09 LAB
APTT BLD: 43.7 SEC (ref 24.5–35.1)
BASOPHILS # BLD: 0.03 E9/L (ref 0–0.2)
BASOPHILS NFR BLD: 1.1 % (ref 0–2)
BILIRUB UR QL STRIP: NEGATIVE
CLARITY UR: CLEAR
COLOR UR: YELLOW
EOSINOPHIL # BLD: 0.03 E9/L (ref 0.05–0.5)
EOSINOPHIL NFR BLD: 1.1 % (ref 0–6)
ERYTHROCYTE [DISTWIDTH] IN BLOOD BY AUTOMATED COUNT: 13.9 FL (ref 11.5–15)
GLUCOSE UR STRIP-MCNC: NEGATIVE MG/DL
HCT VFR BLD AUTO: 38.8 % (ref 34–48)
HGB BLD-MCNC: 12.6 G/DL (ref 11.5–15.5)
HGB UR QL STRIP: NEGATIVE
IMM GRANULOCYTES # BLD: 0.01 E9/L
IMM GRANULOCYTES NFR BLD: 0.4 % (ref 0–5)
INR BLD: 1.1
KETONES UR STRIP-MCNC: NEGATIVE MG/DL
LEUKOCYTE ESTERASE UR QL STRIP: NEGATIVE
LYMPHOCYTES # BLD: 1.14 E9/L (ref 1.5–4)
LYMPHOCYTES NFR BLD: 41.3 % (ref 20–42)
MCH RBC QN AUTO: 31.7 PG (ref 26–35)
MCHC RBC AUTO-ENTMCNC: 32.5 % (ref 32–34.5)
MCV RBC AUTO: 97.5 FL (ref 80–99.9)
MONOCYTES # BLD: 0.37 E9/L (ref 0.1–0.95)
MONOCYTES NFR BLD: 13.4 % (ref 2–12)
NEUTROPHILS # BLD: 1.18 E9/L (ref 1.8–7.3)
NEUTS SEG NFR BLD: 42.7 % (ref 43–80)
NITRITE UR QL STRIP: NEGATIVE
PH UR STRIP: 5.5 [PH] (ref 5–9)
PLATELET # BLD AUTO: 280 E9/L (ref 130–450)
PMV BLD AUTO: 10.1 FL (ref 7–12)
PROT UR STRIP-MCNC: NEGATIVE MG/DL
PROTHROMBIN TIME: 12 SEC (ref 9.3–12.4)
RBC # BLD AUTO: 3.98 E12/L (ref 3.5–5.5)
SP GR UR STRIP: 1.01 (ref 1–1.03)
UROBILINOGEN UR STRIP-ACNC: 0.2 E.U./DL
WBC # BLD: 2.8 E9/L (ref 4.5–11.5)

## 2023-05-10 DIAGNOSIS — Z79.899 HIGH RISK MEDICATION USE: Primary | ICD-10-CM

## 2023-05-10 RX ORDER — METHYLPREDNISOLONE 4 MG/1
TABLET ORAL
Qty: 1 KIT | Refills: 0 | Status: SHIPPED | OUTPATIENT
Start: 2023-05-10

## 2023-05-12 LAB — BACTERIA UR CULT: NORMAL

## 2023-06-14 ENCOUNTER — PREP FOR PROCEDURE (OUTPATIENT)
Dept: PAIN MANAGEMENT | Age: 68
End: 2023-06-14

## 2023-06-14 DIAGNOSIS — M51.26 OTHER INTERVERTEBRAL DISC DISPLACEMENT, LUMBAR REGION: Primary | ICD-10-CM

## 2023-06-22 ENCOUNTER — ANESTHESIA EVENT (OUTPATIENT)
Dept: OPERATING ROOM | Age: 68
End: 2023-06-22
Payer: MEDICARE

## 2023-06-22 DIAGNOSIS — M51.26 OTHER INTERVERTEBRAL DISC DISPLACEMENT, LUMBAR REGION: ICD-10-CM

## 2023-06-22 LAB
APTT BLD: 44 SEC (ref 24.5–35.1)
BACTERIA URNS QL MICRO: ABNORMAL /HPF
BASOPHILS # BLD: 0.06 E9/L (ref 0–0.2)
BASOPHILS NFR BLD: 0.9 % (ref 0–2)
BILIRUB UR QL STRIP: NEGATIVE
BURR CELLS: ABNORMAL
CLARITY UR: CLEAR
COLOR UR: YELLOW
EOSINOPHIL # BLD: 0 E9/L (ref 0.05–0.5)
EOSINOPHIL NFR BLD: 0.4 % (ref 0–6)
EPI CELLS #/AREA URNS HPF: ABNORMAL /HPF
ERYTHROCYTE [DISTWIDTH] IN BLOOD BY AUTOMATED COUNT: 13.6 FL (ref 11.5–15)
GLUCOSE UR STRIP-MCNC: NEGATIVE MG/DL
HCT VFR BLD AUTO: 41.7 % (ref 34–48)
HGB BLD-MCNC: 13.2 G/DL (ref 11.5–15.5)
HGB UR QL STRIP: ABNORMAL
INR BLD: 1.1
KETONES UR STRIP-MCNC: NEGATIVE MG/DL
LEUKOCYTE ESTERASE UR QL STRIP: NEGATIVE
LYMPHOCYTES # BLD: 0.61 E9/L (ref 1.5–4)
LYMPHOCYTES NFR BLD: 8.9 % (ref 20–42)
MCH RBC QN AUTO: 32 PG (ref 26–35)
MCHC RBC AUTO-ENTMCNC: 31.7 % (ref 32–34.5)
MCV RBC AUTO: 101 FL (ref 80–99.9)
MONOCYTES # BLD: 0.75 E9/L (ref 0.1–0.95)
MONOCYTES NFR BLD: 10.6 % (ref 2–12)
NEUTROPHILS # BLD: 5.44 E9/L (ref 1.8–7.3)
NEUTS SEG NFR BLD: 79.6 % (ref 43–80)
NITRITE UR QL STRIP: NEGATIVE
OVALOCYTES: ABNORMAL
PH UR STRIP: 6 [PH] (ref 5–9)
PLATELET # BLD AUTO: 267 E9/L (ref 130–450)
PMV BLD AUTO: 10.3 FL (ref 7–12)
POIKILOCYTES: ABNORMAL
POLYCHROMASIA: ABNORMAL
PROT UR STRIP-MCNC: NEGATIVE MG/DL
PROTHROMBIN TIME: 12.2 SEC (ref 9.3–12.4)
RBC # BLD AUTO: 4.13 E12/L (ref 3.5–5.5)
RBC #/AREA URNS HPF: ABNORMAL /HPF (ref 0–2)
SP GR UR STRIP: 1.01 (ref 1–1.03)
UROBILINOGEN UR STRIP-ACNC: 0.2 E.U./DL
WBC # BLD: 6.8 E9/L (ref 4.5–11.5)
WBC #/AREA URNS HPF: ABNORMAL /HPF (ref 0–5)

## 2023-06-25 LAB — BACTERIA UR CULT: NORMAL

## 2023-06-27 PROBLEM — M51.26 OTHER INTERVERTEBRAL DISC DISPLACEMENT, LUMBAR REGION: Chronic | Status: ACTIVE | Noted: 2023-06-27

## 2023-06-28 ENCOUNTER — HOSPITAL ENCOUNTER (OUTPATIENT)
Age: 68
Setting detail: OUTPATIENT SURGERY
Discharge: HOME | End: 2023-06-28
Attending: ANESTHESIOLOGY | Admitting: ANESTHESIOLOGY
Payer: MEDICARE

## 2023-06-28 ENCOUNTER — ANESTHESIA (OUTPATIENT)
Dept: OPERATING ROOM | Age: 68
End: 2023-06-28
Payer: MEDICARE

## 2023-06-28 ENCOUNTER — HOSPITAL ENCOUNTER (OUTPATIENT)
Dept: OPERATING ROOM | Age: 68
Setting detail: OUTPATIENT SURGERY
Discharge: HOME | End: 2023-06-28
Attending: ANESTHESIOLOGY
Payer: MEDICARE

## 2023-06-28 VITALS
RESPIRATION RATE: 16 BRPM | HEIGHT: 65 IN | HEART RATE: 65 BPM | OXYGEN SATURATION: 98 % | TEMPERATURE: 98 F | WEIGHT: 167 LBS | SYSTOLIC BLOOD PRESSURE: 120 MMHG | DIASTOLIC BLOOD PRESSURE: 82 MMHG | BODY MASS INDEX: 27.82 KG/M2

## 2023-06-28 DIAGNOSIS — G89.18 ACUTE POST-OPERATIVE PAIN: Primary | ICD-10-CM

## 2023-06-28 DIAGNOSIS — Z96.89 S/P INSERTION OF SPINAL CORD STIMULATOR: ICD-10-CM

## 2023-06-28 PROCEDURE — 2720000010 HC SURG SUPPLY STERILE: Performed by: ANESTHESIOLOGY

## 2023-06-28 PROCEDURE — 3600000015 HC SURGERY LEVEL 5 ADDTL 15MIN: Performed by: ANESTHESIOLOGY

## 2023-06-28 PROCEDURE — C1778 LEAD, NEUROSTIMULATOR: HCPCS | Performed by: ANESTHESIOLOGY

## 2023-06-28 PROCEDURE — 3700000000 HC ANESTHESIA ATTENDED CARE: Performed by: ANESTHESIOLOGY

## 2023-06-28 PROCEDURE — 3209999900 FLUORO FOR SURGICAL PROCEDURES

## 2023-06-28 PROCEDURE — 2709999900 HC NON-CHARGEABLE SUPPLY: Performed by: ANESTHESIOLOGY

## 2023-06-28 PROCEDURE — C1781 MESH (IMPLANTABLE): HCPCS | Performed by: ANESTHESIOLOGY

## 2023-06-28 PROCEDURE — 3700000001 HC ADD 15 MINUTES (ANESTHESIA): Performed by: ANESTHESIOLOGY

## 2023-06-28 PROCEDURE — 7100000010 HC PHASE II RECOVERY - FIRST 15 MIN: Performed by: ANESTHESIOLOGY

## 2023-06-28 PROCEDURE — 2500000003 HC RX 250 WO HCPCS: Performed by: NURSE ANESTHETIST, CERTIFIED REGISTERED

## 2023-06-28 PROCEDURE — 2500000003 HC RX 250 WO HCPCS: Performed by: ANESTHESIOLOGY

## 2023-06-28 PROCEDURE — C1820 GENERATOR NEURO RECHG BAT SY: HCPCS | Performed by: ANESTHESIOLOGY

## 2023-06-28 PROCEDURE — 2580000003 HC RX 258: Performed by: ANESTHESIOLOGY

## 2023-06-28 PROCEDURE — 3600000005 HC SURGERY LEVEL 5 BASE: Performed by: ANESTHESIOLOGY

## 2023-06-28 PROCEDURE — 6360000002 HC RX W HCPCS: Performed by: NURSE ANESTHETIST, CERTIFIED REGISTERED

## 2023-06-28 PROCEDURE — 6370000000 HC RX 637 (ALT 250 FOR IP): Performed by: ANESTHESIOLOGY

## 2023-06-28 PROCEDURE — 7100000011 HC PHASE II RECOVERY - ADDTL 15 MIN: Performed by: ANESTHESIOLOGY

## 2023-06-28 PROCEDURE — 6360000002 HC RX W HCPCS: Performed by: ANESTHESIOLOGY

## 2023-06-28 DEVICE — POUCH TYRX NEURO ANTIMICROBIAL MED: Type: IMPLANTABLE DEVICE | Status: FUNCTIONAL

## 2023-06-28 DEVICE — KIT LD L60CM 8 ELECTRD PERC COMP CONTAIN LD ANCHR GWIRE NDL: Type: IMPLANTABLE DEVICE | Status: FUNCTIONAL

## 2023-06-28 DEVICE — DEVICE NEUROSTIMULATOR 13.9CC W1.9XH2.2IN 29.1GM RECHRG: Type: IMPLANTABLE DEVICE | Status: FUNCTIONAL

## 2023-06-28 RX ORDER — TRAMADOL HYDROCHLORIDE 50 MG/1
50 TABLET ORAL EVERY 6 HOURS PRN
Status: DISCONTINUED | OUTPATIENT
Start: 2023-06-28 | End: 2023-06-28 | Stop reason: HOSPADM

## 2023-06-28 RX ORDER — DIPHENHYDRAMINE HYDROCHLORIDE 50 MG/ML
INJECTION INTRAMUSCULAR; INTRAVENOUS PRN
Status: DISCONTINUED | OUTPATIENT
Start: 2023-06-28 | End: 2023-06-28 | Stop reason: SDUPTHER

## 2023-06-28 RX ORDER — CEPHALEXIN 500 MG/1
500 CAPSULE ORAL 3 TIMES DAILY
Qty: 30 CAPSULE | Refills: 0 | Status: SHIPPED | OUTPATIENT
Start: 2023-06-28 | End: 2023-07-08

## 2023-06-28 RX ORDER — FENTANYL CITRATE 50 UG/ML
INJECTION, SOLUTION INTRAMUSCULAR; INTRAVENOUS PRN
Status: DISCONTINUED | OUTPATIENT
Start: 2023-06-28 | End: 2023-06-28 | Stop reason: SDUPTHER

## 2023-06-28 RX ORDER — LIDOCAINE HYDROCHLORIDE 20 MG/ML
INJECTION, SOLUTION INTRAVENOUS PRN
Status: DISCONTINUED | OUTPATIENT
Start: 2023-06-28 | End: 2023-06-28 | Stop reason: SDUPTHER

## 2023-06-28 RX ORDER — LIDOCAINE HYDROCHLORIDE 10 MG/ML
INJECTION, SOLUTION EPIDURAL; INFILTRATION; INTRACAUDAL; PERINEURAL PRN
Status: DISCONTINUED | OUTPATIENT
Start: 2023-06-28 | End: 2023-06-28 | Stop reason: HOSPADM

## 2023-06-28 RX ORDER — SODIUM CHLORIDE 0.9 % (FLUSH) 0.9 %
5-40 SYRINGE (ML) INJECTION EVERY 12 HOURS SCHEDULED
Status: DISCONTINUED | OUTPATIENT
Start: 2023-06-28 | End: 2023-06-28 | Stop reason: HOSPADM

## 2023-06-28 RX ORDER — MORPHINE SULFATE 2 MG/ML
1 INJECTION, SOLUTION INTRAMUSCULAR; INTRAVENOUS EVERY 5 MIN PRN
Status: DISCONTINUED | OUTPATIENT
Start: 2023-06-28 | End: 2023-06-28 | Stop reason: HOSPADM

## 2023-06-28 RX ORDER — SODIUM CHLORIDE 9 MG/ML
INJECTION, SOLUTION INTRAVENOUS PRN
Status: DISCONTINUED | OUTPATIENT
Start: 2023-06-28 | End: 2023-06-28 | Stop reason: HOSPADM

## 2023-06-28 RX ORDER — PROPOFOL 10 MG/ML
INJECTION, EMULSION INTRAVENOUS CONTINUOUS PRN
Status: DISCONTINUED | OUTPATIENT
Start: 2023-06-28 | End: 2023-06-28 | Stop reason: SDUPTHER

## 2023-06-28 RX ORDER — OXYCODONE HYDROCHLORIDE AND ACETAMINOPHEN 5; 325 MG/1; MG/1
1 TABLET ORAL EVERY 4 HOURS PRN
Status: DISCONTINUED | OUTPATIENT
Start: 2023-06-28 | End: 2023-06-28 | Stop reason: HOSPADM

## 2023-06-28 RX ORDER — SODIUM CHLORIDE, SODIUM LACTATE, POTASSIUM CHLORIDE, CALCIUM CHLORIDE 600; 310; 30; 20 MG/100ML; MG/100ML; MG/100ML; MG/100ML
INJECTION, SOLUTION INTRAVENOUS CONTINUOUS
Status: DISCONTINUED | OUTPATIENT
Start: 2023-06-28 | End: 2023-06-28 | Stop reason: HOSPADM

## 2023-06-28 RX ORDER — OXYCODONE HYDROCHLORIDE AND ACETAMINOPHEN 5; 325 MG/1; MG/1
1 TABLET ORAL EVERY 4 HOURS PRN
Qty: 18 TABLET | Refills: 0 | Status: SHIPPED | OUTPATIENT
Start: 2023-06-28 | End: 2023-07-01

## 2023-06-28 RX ORDER — MIDAZOLAM HYDROCHLORIDE 1 MG/ML
INJECTION INTRAMUSCULAR; INTRAVENOUS PRN
Status: DISCONTINUED | OUTPATIENT
Start: 2023-06-28 | End: 2023-06-28 | Stop reason: SDUPTHER

## 2023-06-28 RX ORDER — DIPHENHYDRAMINE HYDROCHLORIDE 50 MG/ML
12.5 INJECTION INTRAMUSCULAR; INTRAVENOUS
Status: DISCONTINUED | OUTPATIENT
Start: 2023-06-28 | End: 2023-06-28 | Stop reason: HOSPADM

## 2023-06-28 RX ORDER — ONDANSETRON 2 MG/ML
INJECTION INTRAMUSCULAR; INTRAVENOUS PRN
Status: DISCONTINUED | OUTPATIENT
Start: 2023-06-28 | End: 2023-06-28 | Stop reason: SDUPTHER

## 2023-06-28 RX ORDER — KETAMINE HYDROCHLORIDE 50 MG/ML
INJECTION, SOLUTION, CONCENTRATE INTRAMUSCULAR; INTRAVENOUS PRN
Status: DISCONTINUED | OUTPATIENT
Start: 2023-06-28 | End: 2023-06-28 | Stop reason: SDUPTHER

## 2023-06-28 RX ORDER — SODIUM CHLORIDE 0.9 % (FLUSH) 0.9 %
5-40 SYRINGE (ML) INJECTION PRN
Status: DISCONTINUED | OUTPATIENT
Start: 2023-06-28 | End: 2023-06-28 | Stop reason: HOSPADM

## 2023-06-28 RX ORDER — FENTANYL CITRATE 0.05 MG/ML
50 INJECTION, SOLUTION INTRAMUSCULAR; INTRAVENOUS EVERY 5 MIN PRN
Status: DISCONTINUED | OUTPATIENT
Start: 2023-06-28 | End: 2023-06-28 | Stop reason: HOSPADM

## 2023-06-28 RX ORDER — HYDROCODONE BITARTRATE AND ACETAMINOPHEN 5; 325 MG/1; MG/1
1 TABLET ORAL EVERY 6 HOURS PRN
Status: DISCONTINUED | OUTPATIENT
Start: 2023-06-28 | End: 2023-06-28 | Stop reason: HOSPADM

## 2023-06-28 RX ORDER — DROPERIDOL 2.5 MG/ML
0.62 INJECTION, SOLUTION INTRAMUSCULAR; INTRAVENOUS
Status: DISCONTINUED | OUTPATIENT
Start: 2023-06-28 | End: 2023-06-28 | Stop reason: HOSPADM

## 2023-06-28 RX ADMIN — MIDAZOLAM 2 MG: 1 INJECTION INTRAMUSCULAR; INTRAVENOUS at 08:35

## 2023-06-28 RX ADMIN — TRAMADOL HYDROCHLORIDE 50 MG: 50 TABLET, COATED ORAL at 10:35

## 2023-06-28 RX ADMIN — FENTANYL CITRATE 50 MCG: 50 INJECTION, SOLUTION INTRAMUSCULAR; INTRAVENOUS at 09:39

## 2023-06-28 RX ADMIN — FENTANYL CITRATE 50 MCG: 50 INJECTION, SOLUTION INTRAMUSCULAR; INTRAVENOUS at 08:37

## 2023-06-28 RX ADMIN — ONDANSETRON 4 MG: 2 INJECTION INTRAMUSCULAR; INTRAVENOUS at 08:36

## 2023-06-28 RX ADMIN — SODIUM CHLORIDE, POTASSIUM CHLORIDE, SODIUM LACTATE AND CALCIUM CHLORIDE: 600; 310; 30; 20 INJECTION, SOLUTION INTRAVENOUS at 07:46

## 2023-06-28 RX ADMIN — PROPOFOL INJECTABLE EMULSION 100 MCG/KG/MIN: 10 INJECTION, EMULSION INTRAVENOUS at 08:37

## 2023-06-28 RX ADMIN — LIDOCAINE HYDROCHLORIDE 20 MG: 20 INJECTION, SOLUTION INTRAVENOUS at 08:37

## 2023-06-28 RX ADMIN — DIPHENHYDRAMINE HYDROCHLORIDE 12.5 MG: 50 INJECTION, SOLUTION INTRAMUSCULAR; INTRAVENOUS at 08:36

## 2023-06-28 RX ADMIN — KETAMINE HYDROCHLORIDE 30 MG: 50 INJECTION INTRAMUSCULAR; INTRAVENOUS at 09:10

## 2023-06-28 RX ADMIN — WATER 2000 MG: 1 INJECTION INTRAMUSCULAR; INTRAVENOUS; SUBCUTANEOUS at 08:30

## 2023-06-28 ASSESSMENT — PAIN DESCRIPTION - LOCATION
LOCATION: BACK

## 2023-06-28 ASSESSMENT — PAIN DESCRIPTION - DESCRIPTORS
DESCRIPTORS: BURNING;ACHING
DESCRIPTORS: BURNING
DESCRIPTORS: ACHING;BURNING;NAGGING
DESCRIPTORS: ACHING;BURNING

## 2023-06-28 ASSESSMENT — PAIN SCALES - GENERAL
PAINLEVEL_OUTOF10: 8
PAINLEVEL_OUTOF10: 5
PAINLEVEL_OUTOF10: 8

## 2023-06-28 ASSESSMENT — PAIN DESCRIPTION - PAIN TYPE
TYPE: SURGICAL PAIN

## 2023-07-24 ENCOUNTER — OFFICE VISIT (OUTPATIENT)
Dept: RHEUMATOLOGY | Age: 68
End: 2023-07-24
Payer: MEDICARE

## 2023-07-24 VITALS — BODY MASS INDEX: 26.99 KG/M2 | WEIGHT: 162 LBS | HEIGHT: 65 IN

## 2023-07-24 DIAGNOSIS — Z79.899 HIGH RISK MEDICATION USE: ICD-10-CM

## 2023-07-24 DIAGNOSIS — D84.9 IMMUNOSUPPRESSION (HCC): ICD-10-CM

## 2023-07-24 DIAGNOSIS — M54.16 LUMBAR RADICULOPATHY: Chronic | ICD-10-CM

## 2023-07-24 DIAGNOSIS — M06.09 RHEUMATOID ARTHRITIS OF MULTIPLE SITES WITH NEGATIVE RHEUMATOID FACTOR (HCC): Primary | ICD-10-CM

## 2023-07-24 PROCEDURE — 99215 OFFICE O/P EST HI 40 MIN: CPT | Performed by: INTERNAL MEDICINE

## 2023-07-24 PROCEDURE — 1123F ACP DISCUSS/DSCN MKR DOCD: CPT | Performed by: INTERNAL MEDICINE

## 2023-07-24 ASSESSMENT — ENCOUNTER SYMPTOMS
NAUSEA: 0
BACK PAIN: 1
TROUBLE SWALLOWING: 0
COUGH: 0
ABDOMINAL PAIN: 0
COLOR CHANGE: 0
VOMITING: 0
DIARRHEA: 0

## 2023-07-24 NOTE — PROGRESS NOTES
Juan Pablo Platt 1955 is a 74 y.o. female, here for evaluation of the following chief complaint(s):  Follow-up (Patient here for follow up on RA. )         ASSESSMENT/PLAN:    Juan Pablo Platt 1955 is a 74 y.o. female seen in follow-up for rheumatoid arthritis. 1.  Seronegative, erosive rheumatoid arthritis-with some synovitis on exam today as she has been off of Rinvoq for about a month. The plan last visit was also to start her on Areva 20 mg daily. However, she ultimately ended up having a spinal cord stimulator placed since last visit so held off on starting the [place] We will go ahead and restart Rinvoq today and start the [place] as well. 2.  Immunosuppression-I recommend she stay up-to-date on vaccinations. She refuses the COVID vaccine. She is understanding and accepting of the increased risk of severe Covid infection on Rinvoq. In the event of any acute infectious illness she should hold Rinvoq and [place] 3.  High risk medication use-we will monitor basic blood work monthly for the first 3 months on [place]. Will need to monitor basic blood work every 3 months on Rinvoq. PCP is following her cholesterol. We will monitor for infection. 4.  Elevated ACE level-she does have some shortness of breath. Chest x-ray is normal.    5.  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 On omeprazole. Avoid systemic NSAIDs. She is unable to tolerate topical NSAIDs either. 6.  Chronic neck and back pain-rheumatoid arthritis spares the back and very rarely affects the AA joint in the neck. I suspect this is all a separate issue. She is following with pain management. As above since last visit she had a spinal cord stimulator placed. She is on Lyrica. 1. Rheumatoid arthritis of multiple sites with negative rheumatoid factor (HCC)  -     CBC with Auto Differential; Standing  -     Comprehensive Metabolic Panel; Standing  2.  Immunosuppression (HCC)  -     CBC with Auto

## 2023-11-02 ENCOUNTER — OFFICE VISIT (OUTPATIENT)
Dept: RHEUMATOLOGY | Age: 68
End: 2023-11-02
Payer: MEDICARE

## 2023-11-02 VITALS — WEIGHT: 152 LBS | HEIGHT: 65 IN | BODY MASS INDEX: 25.33 KG/M2

## 2023-11-02 DIAGNOSIS — M54.16 LUMBAR RADICULOPATHY: Chronic | ICD-10-CM

## 2023-11-02 DIAGNOSIS — Z79.899 HIGH RISK MEDICATION USE: ICD-10-CM

## 2023-11-02 DIAGNOSIS — D84.9 IMMUNOSUPPRESSION (HCC): ICD-10-CM

## 2023-11-02 DIAGNOSIS — M06.09 RHEUMATOID ARTHRITIS OF MULTIPLE SITES WITH NEGATIVE RHEUMATOID FACTOR (HCC): Primary | ICD-10-CM

## 2023-11-02 PROCEDURE — 1123F ACP DISCUSS/DSCN MKR DOCD: CPT | Performed by: INTERNAL MEDICINE

## 2023-11-02 PROCEDURE — 99214 OFFICE O/P EST MOD 30 MIN: CPT | Performed by: INTERNAL MEDICINE

## 2023-11-02 RX ORDER — UPADACITINIB 15 MG/1
15 TABLET, EXTENDED RELEASE ORAL DAILY
Qty: 30 TABLET | Refills: 11 | Status: SHIPPED | OUTPATIENT
Start: 2023-11-02

## 2023-11-02 RX ORDER — LEFLUNOMIDE 20 MG/1
20 TABLET ORAL DAILY
Qty: 30 TABLET | Refills: 5 | Status: SHIPPED | OUTPATIENT
Start: 2023-11-02

## 2023-11-02 ASSESSMENT — ENCOUNTER SYMPTOMS
VOMITING: 0
TROUBLE SWALLOWING: 0
NAUSEA: 0
COLOR CHANGE: 0
DIARRHEA: 0
ABDOMINAL PAIN: 0
COUGH: 0
BACK PAIN: 1

## 2023-11-02 NOTE — PROGRESS NOTES
General: Skin is warm and dry. Findings: No rash. Neurological:      General: No focal deficit present. Mental Status: She is alert and oriented to person, place, and time. Mental status is at baseline. Psychiatric:         Mood and Affect: Mood normal.         Behavior: Behavior normal.            Lab Results   Component Value Date    WBC 6.8 06/22/2023    HGB 13.2 06/22/2023    HCT 41.7 06/22/2023    .0 (H) 06/22/2023     06/22/2023     Lab Results   Component Value Date     06/12/2023    K 4.1 06/12/2023     06/12/2023    CO2 26 06/12/2023    BUN 18 06/12/2023    CREATININE 0.8 06/12/2023    GLUCOSE 120 (H) 06/12/2023    CALCIUM 9.1 06/12/2023    PROT 6.6 06/12/2023    LABALBU 4.2 06/12/2023    BILITOT 0.3 06/12/2023    ALKPHOS 101 06/12/2023    AST 27 06/12/2023    ALT 20 06/12/2023    LABGLOM >60 06/12/2023    GFRAA >60 04/07/2022     No results found for: \"NATALEE\"  No results found for: \"RHEUMFACTOR\"  Lab Results   Component Value Date    SEDRATE 2 04/24/2023     Lab Results   Component Value Date    CRP <0.3 04/24/2023            An electronic signature was used to authenticate this note. This note was generated with a voice recognition dictation system. Please disregard any errors or omission which have escaped my review.     --Anaya Marroquin, DO

## 2023-12-12 ENCOUNTER — PREP FOR PROCEDURE (OUTPATIENT)
Dept: PAIN MANAGEMENT | Age: 68
End: 2023-12-12

## 2023-12-12 DIAGNOSIS — Z79.01 MONITORING FOR ANTICOAGULANT USE: Primary | ICD-10-CM

## 2023-12-12 DIAGNOSIS — Z51.81 MONITORING FOR ANTICOAGULANT USE: Primary | ICD-10-CM

## 2023-12-12 DIAGNOSIS — M51.26 OTHER INTERVERTEBRAL DISC DISPLACEMENT, LUMBAR REGION: ICD-10-CM

## 2024-01-03 DIAGNOSIS — Z79.01 MONITORING FOR ANTICOAGULANT USE: ICD-10-CM

## 2024-01-03 DIAGNOSIS — M51.26 OTHER INTERVERTEBRAL DISC DISPLACEMENT, LUMBAR REGION: ICD-10-CM

## 2024-01-03 DIAGNOSIS — Z51.81 MONITORING FOR ANTICOAGULANT USE: ICD-10-CM

## 2024-01-05 ENCOUNTER — ANESTHESIA EVENT (OUTPATIENT)
Dept: OPERATING ROOM | Age: 69
End: 2024-01-05
Payer: MEDICARE

## 2024-01-05 NOTE — ANESTHESIA PRE PROCEDURE
Department of Anesthesiology  Preprocedure Note       Name:  Marissa Washington   Age:  68 y.o.  :  1955                                          MRN:  [id number]         Date:  2024      Surgeon: Surgeon(s):  Tabby Baldwin DO    Procedure: Procedure(s):  MEDTRONIC PAIN PUMP REMOVAL ( PAIN PUMP LOCATED RIGHT BUTTOCK)    Medications prior to admission:   Prior to Admission medications    Medication Sig Start Date End Date Taking? Authorizing Provider   Upadacitinib ER (RINVOQ) 15 MG TB24 Take 15 mg by mouth daily 23   Swapnil Harrell DO   leflunomide (ARAVA) 20 MG tablet Take 1 tablet by mouth daily 23   Swapnil Harrell DO   NONFORMULARY Cholestoff - cholesterol supplement OTC, patient takes one tablet daily    Thao Schulz MD   Biotin 21387 MCG TABS Take 1 tablet by mouth daily    Thao Schulz MD   b complex vitamins capsule Take 1 capsule by mouth daily    Thao Schulz MD   Probiotic Product (PRO-BIOTIC BLEND PO) Take 1 tablet by mouth daily    Thao Schulz MD   ketoconazole (NIZORAL) 2 % cream Apply topically as needed Apply topically daily.     Thao Schulz MD   traMADol (ULTRAM) 50 MG tablet Take 1 tablet by mouth 2 times daily as needed for Pain (break through pain).    Thao Schulz MD   traMADol (ULTRAM ER) 200 MG extended release tablet Take 1 tablet by mouth daily.    Thao Schulz MD   pregabalin (LYRICA) 200 MG capsule Take 75 mg by mouth 2 times daily.     Thao Schulz MD   lidocaine (XYLOCAINE) 5 % ointment Apply topically as needed for pain up to 3 times a day to affected area. 18   Debbie Eaton MD   lidocaine (LIDODERM) 5 % APPLY three PATCH's TO THREE AFFECTED AREAS 12 HOURS ON 12 HOURS OFF. 18  Debbie Eaton MD   Turmeric Curcumin 500 MG CAPS Take 1 capsule by mouth    Thao Schulz MD   MILK THISTLE PO Take 2 tablets by mouth daily    Thao Schulz MD   Coenzyme

## 2024-01-10 ENCOUNTER — HOSPITAL ENCOUNTER (OUTPATIENT)
Age: 69
Setting detail: OUTPATIENT SURGERY
Discharge: HOME | End: 2024-01-10
Attending: ANESTHESIOLOGY | Admitting: ANESTHESIOLOGY
Payer: MEDICARE

## 2024-01-10 ENCOUNTER — ANESTHESIA (OUTPATIENT)
Dept: OPERATING ROOM | Age: 69
End: 2024-01-10
Payer: MEDICARE

## 2024-01-10 ENCOUNTER — HOSPITAL ENCOUNTER (OUTPATIENT)
Dept: OPERATING ROOM | Age: 69
Setting detail: OUTPATIENT SURGERY
Discharge: HOME | End: 2024-01-10
Attending: ANESTHESIOLOGY
Payer: MEDICARE

## 2024-01-10 VITALS
RESPIRATION RATE: 16 BRPM | BODY MASS INDEX: 25.99 KG/M2 | HEIGHT: 65 IN | SYSTOLIC BLOOD PRESSURE: 105 MMHG | HEART RATE: 81 BPM | DIASTOLIC BLOOD PRESSURE: 53 MMHG | TEMPERATURE: 97.3 F | OXYGEN SATURATION: 95 % | WEIGHT: 156 LBS

## 2024-01-10 DIAGNOSIS — M51.26 OTHER INTERVERTEBRAL DISC DISPLACEMENT, LUMBAR REGION: ICD-10-CM

## 2024-01-10 PROCEDURE — 2580000003 HC RX 258: Performed by: ANESTHESIOLOGY

## 2024-01-10 PROCEDURE — 3600000015 HC SURGERY LEVEL 5 ADDTL 15MIN: Performed by: ANESTHESIOLOGY

## 2024-01-10 PROCEDURE — 3700000000 HC ANESTHESIA ATTENDED CARE: Performed by: ANESTHESIOLOGY

## 2024-01-10 PROCEDURE — 2500000003 HC RX 250 WO HCPCS: Performed by: NURSE ANESTHETIST, CERTIFIED REGISTERED

## 2024-01-10 PROCEDURE — 6360000002 HC RX W HCPCS: Performed by: NURSE ANESTHETIST, CERTIFIED REGISTERED

## 2024-01-10 PROCEDURE — 3600000005 HC SURGERY LEVEL 5 BASE: Performed by: ANESTHESIOLOGY

## 2024-01-10 PROCEDURE — 6360000002 HC RX W HCPCS: Performed by: ANESTHESIOLOGY

## 2024-01-10 PROCEDURE — A4217 STERILE WATER/SALINE, 500 ML: HCPCS | Performed by: ANESTHESIOLOGY

## 2024-01-10 PROCEDURE — 7100000011 HC PHASE II RECOVERY - ADDTL 15 MIN: Performed by: ANESTHESIOLOGY

## 2024-01-10 PROCEDURE — 2500000003 HC RX 250 WO HCPCS: Performed by: ANESTHESIOLOGY

## 2024-01-10 PROCEDURE — 7100000010 HC PHASE II RECOVERY - FIRST 15 MIN: Performed by: ANESTHESIOLOGY

## 2024-01-10 PROCEDURE — 2709999900 HC NON-CHARGEABLE SUPPLY: Performed by: ANESTHESIOLOGY

## 2024-01-10 PROCEDURE — 3700000001 HC ADD 15 MINUTES (ANESTHESIA): Performed by: ANESTHESIOLOGY

## 2024-01-10 RX ORDER — ONDANSETRON 2 MG/ML
INJECTION INTRAMUSCULAR; INTRAVENOUS PRN
Status: DISCONTINUED | OUTPATIENT
Start: 2024-01-10 | End: 2024-01-10 | Stop reason: SDUPTHER

## 2024-01-10 RX ORDER — LIDOCAINE HYDROCHLORIDE 10 MG/ML
INJECTION, SOLUTION EPIDURAL; INFILTRATION; INTRACAUDAL; PERINEURAL PRN
Status: DISCONTINUED | OUTPATIENT
Start: 2024-01-10 | End: 2024-01-10 | Stop reason: HOSPADM

## 2024-01-10 RX ORDER — KETAMINE HCL IN NACL, ISO-OSM 100MG/10ML
SYRINGE (ML) INJECTION PRN
Status: DISCONTINUED | OUTPATIENT
Start: 2024-01-10 | End: 2024-01-10 | Stop reason: SDUPTHER

## 2024-01-10 RX ORDER — MORPHINE SULFATE 2 MG/ML
1 INJECTION, SOLUTION INTRAMUSCULAR; INTRAVENOUS EVERY 5 MIN PRN
Status: CANCELLED | OUTPATIENT
Start: 2024-01-10

## 2024-01-10 RX ORDER — MIDAZOLAM HYDROCHLORIDE 1 MG/ML
INJECTION INTRAMUSCULAR; INTRAVENOUS PRN
Status: DISCONTINUED | OUTPATIENT
Start: 2024-01-10 | End: 2024-01-10 | Stop reason: SDUPTHER

## 2024-01-10 RX ORDER — SODIUM CHLORIDE 0.9 % (FLUSH) 0.9 %
5-40 SYRINGE (ML) INJECTION PRN
Status: CANCELLED | OUTPATIENT
Start: 2024-01-10

## 2024-01-10 RX ORDER — DROPERIDOL 2.5 MG/ML
0.62 INJECTION, SOLUTION INTRAMUSCULAR; INTRAVENOUS
Status: CANCELLED | OUTPATIENT
Start: 2024-01-10 | End: 2024-01-11

## 2024-01-10 RX ORDER — SODIUM CHLORIDE 0.9 % (FLUSH) 0.9 %
5-40 SYRINGE (ML) INJECTION EVERY 12 HOURS SCHEDULED
Status: CANCELLED | OUTPATIENT
Start: 2024-01-10

## 2024-01-10 RX ORDER — CEFAZOLIN 2 G/1
INJECTION, POWDER, FOR SOLUTION INTRAMUSCULAR; INTRAVENOUS
Status: DISCONTINUED
Start: 2024-01-10 | End: 2024-01-10 | Stop reason: HOSPADM

## 2024-01-10 RX ORDER — HYDROCODONE BITARTRATE AND ACETAMINOPHEN 5; 325 MG/1; MG/1
1 TABLET ORAL EVERY 6 HOURS PRN
Status: DISCONTINUED | OUTPATIENT
Start: 2024-01-10 | End: 2024-01-10 | Stop reason: HOSPADM

## 2024-01-10 RX ORDER — CEPHALEXIN 500 MG/1
500 CAPSULE ORAL 3 TIMES DAILY
Qty: 30 CAPSULE | Refills: 0 | Status: SHIPPED | OUTPATIENT
Start: 2024-01-10 | End: 2024-01-20

## 2024-01-10 RX ORDER — DIPHENHYDRAMINE HYDROCHLORIDE 50 MG/ML
12.5 INJECTION INTRAMUSCULAR; INTRAVENOUS
Status: CANCELLED | OUTPATIENT
Start: 2024-01-10 | End: 2024-01-11

## 2024-01-10 RX ORDER — PROPOFOL 10 MG/ML
INJECTION, EMULSION INTRAVENOUS CONTINUOUS PRN
Status: DISCONTINUED | OUTPATIENT
Start: 2024-01-10 | End: 2024-01-10 | Stop reason: SDUPTHER

## 2024-01-10 RX ORDER — SODIUM CHLORIDE, SODIUM LACTATE, POTASSIUM CHLORIDE, CALCIUM CHLORIDE 600; 310; 30; 20 MG/100ML; MG/100ML; MG/100ML; MG/100ML
INJECTION, SOLUTION INTRAVENOUS CONTINUOUS
Status: DISCONTINUED | OUTPATIENT
Start: 2024-01-10 | End: 2024-01-10 | Stop reason: HOSPADM

## 2024-01-10 RX ORDER — OXYCODONE HYDROCHLORIDE AND ACETAMINOPHEN 5; 325 MG/1; MG/1
1 TABLET ORAL EVERY 4 HOURS PRN
Status: DISCONTINUED | OUTPATIENT
Start: 2024-01-10 | End: 2024-01-10 | Stop reason: HOSPADM

## 2024-01-10 RX ORDER — SODIUM CHLORIDE 9 MG/ML
INJECTION, SOLUTION INTRAVENOUS PRN
Status: CANCELLED | OUTPATIENT
Start: 2024-01-10

## 2024-01-10 RX ORDER — FENTANYL CITRATE 50 UG/ML
INJECTION, SOLUTION INTRAMUSCULAR; INTRAVENOUS PRN
Status: DISCONTINUED | OUTPATIENT
Start: 2024-01-10 | End: 2024-01-10 | Stop reason: SDUPTHER

## 2024-01-10 RX ADMIN — FENTANYL CITRATE 50 MCG: 50 INJECTION, SOLUTION INTRAMUSCULAR; INTRAVENOUS at 07:43

## 2024-01-10 RX ADMIN — CEFAZOLIN 2000 MG: 2 INJECTION, POWDER, FOR SOLUTION INTRAMUSCULAR; INTRAVENOUS at 07:40

## 2024-01-10 RX ADMIN — Medication 50 MG: at 07:43

## 2024-01-10 RX ADMIN — FENTANYL CITRATE 50 MCG: 50 INJECTION, SOLUTION INTRAMUSCULAR; INTRAVENOUS at 07:45

## 2024-01-10 RX ADMIN — FENTANYL CITRATE 50 MCG: 50 INJECTION, SOLUTION INTRAMUSCULAR; INTRAVENOUS at 08:43

## 2024-01-10 RX ADMIN — ONDANSETRON 4 MG: 2 INJECTION INTRAMUSCULAR; INTRAVENOUS at 07:43

## 2024-01-10 RX ADMIN — PROPOFOL INJECTABLE EMULSION 100 MCG/KG/MIN: 10 INJECTION, EMULSION INTRAVENOUS at 07:43

## 2024-01-10 RX ADMIN — SODIUM CHLORIDE, POTASSIUM CHLORIDE, SODIUM LACTATE AND CALCIUM CHLORIDE: 600; 310; 30; 20 INJECTION, SOLUTION INTRAVENOUS at 06:56

## 2024-01-10 RX ADMIN — FENTANYL CITRATE 50 MCG: 50 INJECTION, SOLUTION INTRAMUSCULAR; INTRAVENOUS at 08:24

## 2024-01-10 RX ADMIN — MIDAZOLAM 2 MG: 1 INJECTION INTRAMUSCULAR; INTRAVENOUS at 07:41

## 2024-01-10 RX ADMIN — FENTANYL CITRATE 50 MCG: 50 INJECTION, SOLUTION INTRAMUSCULAR; INTRAVENOUS at 08:04

## 2024-01-10 ASSESSMENT — LIFESTYLE VARIABLES: SMOKING_STATUS: 0

## 2024-01-10 ASSESSMENT — PAIN - FUNCTIONAL ASSESSMENT
PAIN_FUNCTIONAL_ASSESSMENT: 0-10
PAIN_FUNCTIONAL_ASSESSMENT: 0-10

## 2024-01-10 ASSESSMENT — PAIN DESCRIPTION - DESCRIPTORS: DESCRIPTORS: ACHING

## 2024-01-10 NOTE — DISCHARGE INSTRUCTIONS
Keep dressing dry.  You may remove top white dressing on Friday January 12,2024, but Do Not Remove bottom surgical dressing.  Call office at [phone] for any questions or concerns.     Follow up appointment January 15, 2024 at 11:00 A.M. at [facility] in [place]. To complete antibiotics as prescribed.     Patient to lay flat for 3 days, up to bathroom and for meals only.     Ice pack to operative site PRN.           [facility]  [phone]    [place] office  [phone]  [place] office  [phone]        Removal of Pain Pump Instructions    After Surgery    Do Not:  Lift greater than 5 pounds  Climb an excessive amount of stairs  Drive until cleared by physician    Do:  Build your physical strength gradually by walking brief periods  Get plenty of rest and eat properly to promote wound healing    Wound Care  ?Avoid showers until the sutures from incision have been removed. When taking sponge baths, avoid getting the incision wet.  ?Dressing changes will be done in the office. Remove top white dressing in 2 days.   Do not remove bottom aquacel dressing  ?Call your physician immediately if you notice any of the following signs and symptoms which could indicate infection:  Fever or chills  Temperature above 100 degrees  Increased pain in your incision  Puss/drainage/redness or increased swelling at the incision    Pain Control  ?You may experience pain at the incision sites until the wound has healed.  This is normal and will subside over time.  ?You may apple a cold pack to the wound for 15 minute intervals as needed for the first 24-48 hours following surgery.  ?Continue to take your medication as prescribed.           Infection After Surgery: Care Instructions  Overview  After surgery, an infection is always possible. It doesn't mean that the surgery didn't go well.  Because an infection can be serious, your doctor has taken steps to manage it.  Your doctor checked the infection and

## 2024-01-10 NOTE — BRIEF OP NOTE
Brief Postoperative Note      Patient: Marissa Washington  YOB: 1955  MRN: [id number]    Date of Procedure: 1/10/2024    Pre-Op Diagnosis Codes:     * Other intervertebral disc displacement, lumbar region [M51.26], Chronic pain syndrome (G89.4) Status post Medtronic pain pump with dysfunctional pain relief    Post-Op Diagnosis: Same       Procedure(s):  MEDTRONIC PAIN PUMP REMOVAL ( PAIN PUMP LOCATED RIGHT BUTTOCK)    Surgeon(s):  Tabby Baldwin DO    Assistant:  First Assistant: Sole Ponce RN    Anesthesia: Monitor Anesthesia Care    Estimated Blood Loss (mL): less than 50     Complications: None    Specimens:   None    Implants:  None      Drains: None    Findings: See operative dictation      Electronically signed by Tabby Baldwin DO on 1/10/2024 at 8:40 AM

## 2024-01-10 NOTE — OP NOTE
individual  wraparound stitches used to approximate the tissues around the  interspinous ligament where the catheter had been going through in order  to minimize loss of CSF postoperatively.  We then closed the wound using  a triple layer closure technique with a total of eleven 1-0 Vicryl  interrupted stitches up to the skin edges followed by continuous 3-0  Polysorb plastic closure technique along the skin edges followed by  Steri-Strips, Aquacel dressing and ABD pressure bandage.    I then turned my attention to the pump generator site and we localized  with an additional 10 mL of 1% Xylocaine with a 25-gauge 1.5-inch  disposable needle over the old pump site.    We then reopened the old incision.  I used careful blunt dissection down  to the pump itself which was easily identified and removed it intact  along with the remaining portion of the catheter.  We searched the wound  in good stuart to try to find any and all foreign bodies including anchor  stitches that we could find, and they were all removed.    The wound was then irrigated with antibiotic solution and checked for  strict hemostasis.    I then closed the wound using a triple layer closure technique to  approximate the potential space left by the removal of the pump in the  usual manner including sixteen 1-0 Vicryl interrupted stitches up to the  skin edges followed by continuous 3-0 Polysorb plastic closure technique  along the skin edges followed by Steri-Strips, Aquacel dressing and ABD  pressure bandage.  The patient was fitted for an abdominal binder.    The patient was taken to recovery room where she found to be in stable  condition with good results without complication.  Neurologically  unchanged postprocedure and preprocedure with good results and no  complications.    It should be noted that the patient to be kept flat for 72 hours to  minimize loss of CSF and potential spinal headache.    The patient was given written going home

## 2024-01-10 NOTE — H&P
Update History & Physical    The patient's History and Physical of 12/19/2023 was reviewed with the patient and there were no significant changes.    I examined the patient and there were no significant changes from the previous History and Physical.    Plan: The risk, benefits, expected outcome, and alternative to the recommended procedure have been discussed with the patient.  Patient understands and wants to proceed with the procedure.      Electronically signed by Tabby Baldwin DO on 1/10/24 at 6:57 AM EST

## 2024-01-10 NOTE — ANESTHESIA POSTPROCEDURE EVALUATION
Department of Anesthesiology  Postprocedure Note    Patient: Marissa Washington  MRN: [id number]  YOB: 1955  Date of evaluation: 1/10/2024    Procedure Summary       Date: 01/10/24 Room / Location: [address]    Anesthesia Start: 0740 Anesthesia Stop: 0858    Procedure: MEDTRONIC PAIN PUMP REMOVAL ( PAIN PUMP LOCATED RIGHT BUTTOCK) (Right) Diagnosis:       Other intervertebral disc displacement, lumbar region      (Other intervertebral disc displacement, lumbar region [M51.26])    Surgeons: Tabby Baldwin DO Responsible Provider: Jeff Hidalgo MD    Anesthesia Type: MAC ASA Status: 3            Anesthesia Type: MAC    Shahab Phase I: Shahab Score: 10    Shahab Phase II: Shahab Score: 10    Anesthesia Post Evaluation    Patient location during evaluation: PACU  Patient participation: complete - patient participated  Level of consciousness: awake and alert  Airway patency: patent  Nausea & Vomiting: no nausea and no vomiting  Cardiovascular status: hemodynamically stable  Respiratory status: room air and spontaneous ventilation  Hydration status: stable  Pain management: satisfactory to patient    No notable events documented.

## 2024-03-05 ENCOUNTER — TELEPHONE (OUTPATIENT)
Dept: RHEUMATOLOGY | Age: 69
End: 2024-03-05

## 2024-03-05 DIAGNOSIS — Z79.899 HIGH RISK MEDICATION USE: Primary | ICD-10-CM

## 2024-03-05 NOTE — TELEPHONE ENCOUNTER
Called and spoke with patient and read the comment from Dr. Harrell to her.  Patient understands to get blood work again in 2 weeks.

## 2024-03-05 NOTE — TELEPHONE ENCOUNTER
Please review [facility] results from 03/04/24 and advise if there are any instructions to the patient.     Lab results have been scanned into the chart.      Electronically signed by KRISTEN STARKEY LPN on 3/5/2024 at 9:56 AM

## 2024-04-09 ENCOUNTER — HOSPITAL ENCOUNTER (OUTPATIENT)
Age: 69
Discharge: HOME | End: 2024-04-09
Payer: MEDICARE

## 2024-04-09 DIAGNOSIS — Z79.899 HIGH RISK MEDICATION USE: ICD-10-CM

## 2024-04-09 LAB
BASOPHILS # BLD: 0.04 K/UL (ref 0–0.2)
BASOPHILS NFR BLD: 1 % (ref 0–2)
EOSINOPHIL # BLD: 0.03 K/UL (ref 0.05–0.5)
EOSINOPHILS RELATIVE PERCENT: 1 % (ref 0–6)
ERYTHROCYTE [DISTWIDTH] IN BLOOD BY AUTOMATED COUNT: 14.2 % (ref 11.5–15)
HCT VFR BLD AUTO: 35.9 % (ref 34–48)
HGB BLD-MCNC: 12.2 G/DL (ref 11.5–15.5)
IMM GRANULOCYTES # BLD AUTO: <0.03 K/UL (ref 0–0.58)
IMM GRANULOCYTES NFR BLD: 1 % (ref 0–5)
LYMPHOCYTES NFR BLD: 0.98 K/UL (ref 1.5–4)
LYMPHOCYTES RELATIVE PERCENT: 24 % (ref 20–42)
MCH RBC QN AUTO: 31.9 PG (ref 26–35)
MCHC RBC AUTO-ENTMCNC: 34 G/DL (ref 32–34.5)
MCV RBC AUTO: 93.7 FL (ref 80–99.9)
MONOCYTES NFR BLD: 0.5 K/UL (ref 0.1–0.95)
MONOCYTES NFR BLD: 12 % (ref 2–12)
NEUTROPHILS NFR BLD: 62 % (ref 43–80)
NEUTS SEG NFR BLD: 2.56 K/UL (ref 1.8–7.3)
PLATELET # BLD AUTO: 275 K/UL (ref 130–450)
PMV BLD AUTO: 9.2 FL (ref 7–12)
RBC # BLD AUTO: 3.83 M/UL (ref 3.5–5.5)
WBC OTHER # BLD: 4.1 K/UL (ref 4.5–11.5)

## 2024-04-09 PROCEDURE — 85025 COMPLETE CBC W/AUTO DIFF WBC: CPT

## 2024-04-09 PROCEDURE — 36415 COLL VENOUS BLD VENIPUNCTURE: CPT

## 2024-04-22 ENCOUNTER — OFFICE VISIT (OUTPATIENT)
Age: 69
End: 2024-04-22
Payer: MEDICARE

## 2024-04-22 VITALS
SYSTOLIC BLOOD PRESSURE: 126 MMHG | BODY MASS INDEX: 25.13 KG/M2 | DIASTOLIC BLOOD PRESSURE: 73 MMHG | WEIGHT: 151 LBS | HEART RATE: 64 BPM

## 2024-04-22 DIAGNOSIS — Z12.31 BREAST CANCER SCREENING BY MAMMOGRAM: Primary | ICD-10-CM

## 2024-04-22 PROCEDURE — G0101 CA SCREEN;PELVIC/BREAST EXAM: HCPCS | Performed by: LEGAL MEDICINE

## 2024-04-22 RX ORDER — OMEPRAZOLE 40 MG/1
CAPSULE, DELAYED RELEASE ORAL
COMMUNITY
Start: 2024-02-22

## 2024-04-22 RX ORDER — PREGABALIN 75 MG/1
75 CAPSULE ORAL 2 TIMES DAILY
COMMUNITY
Start: 2024-03-19

## 2024-04-22 SDOH — ECONOMIC STABILITY: FOOD INSECURITY: WITHIN THE PAST 12 MONTHS, YOU WORRIED THAT YOUR FOOD WOULD RUN OUT BEFORE YOU GOT MONEY TO BUY MORE.: NEVER TRUE

## 2024-04-22 SDOH — ECONOMIC STABILITY: FOOD INSECURITY: WITHIN THE PAST 12 MONTHS, THE FOOD YOU BOUGHT JUST DIDN'T LAST AND YOU DIDN'T HAVE MONEY TO GET MORE.: NEVER TRUE

## 2024-04-22 SDOH — ECONOMIC STABILITY: HOUSING INSECURITY
IN THE LAST 12 MONTHS, WAS THERE A TIME WHEN YOU DID NOT HAVE A STEADY PLACE TO SLEEP OR SLEPT IN A SHELTER (INCLUDING NOW)?: NO

## 2024-04-22 SDOH — ECONOMIC STABILITY: INCOME INSECURITY: HOW HARD IS IT FOR YOU TO PAY FOR THE VERY BASICS LIKE FOOD, HOUSING, MEDICAL CARE, AND HEATING?: NOT HARD AT ALL

## 2024-04-22 ASSESSMENT — PATIENT HEALTH QUESTIONNAIRE - PHQ9
SUM OF ALL RESPONSES TO PHQ QUESTIONS 1-9: 0
SUM OF ALL RESPONSES TO PHQ9 QUESTIONS 1 & 2: 0
SUM OF ALL RESPONSES TO PHQ QUESTIONS 1-9: 0
SUM OF ALL RESPONSES TO PHQ QUESTIONS 1-9: 0
1. LITTLE INTEREST OR PLEASURE IN DOING THINGS: NOT AT ALL
SUM OF ALL RESPONSES TO PHQ QUESTIONS 1-9: 0
2. FEELING DOWN, DEPRESSED OR HOPELESS: NOT AT ALL

## 2024-04-22 NOTE — PROGRESS NOTES
retraction                  No use of accessory muscles                 Clear to auscultation                 No rhonchi                 No wheezes     HEART: Regular rate                Regular rhythm                No murmur                No gallop                No rubs    ABDOMEN: Soft, nondistended                      Nontender                      No masses                      No organomegaly                      Normal bowel sounds                      No HERNIA    LYMPHATICS: Neck nodes negative.  Axillary nodes negative.    MUSCULAR AND NEURO:                       Digits and nails without clubbing cyanosis or edema                       RIGHT ARM No tenderness or masses or defect.                                           Full range of motion, with no pain or contracture, dislocation or laxity                                           Normal tone;                                           No atrophy                                           No abnormal movements                                                No deficit to touch                       LEFT ARM : No tenderness or masses or defect                                          Full range of motion, with no pain or contracture, dislocation or laxity;                                          Normal tone                                           No atrophy                                          No abnormal movements                                           No deficit to touch      SKIN: Skin and subcu of chest, breast, arms with: No lesions                                                                                  No rashes                                                                                  No ulcers             Skin and subcu of chest, breast, and arms with :  No induration                                                                                               No nodules

## 2024-05-13 ENCOUNTER — OFFICE VISIT (OUTPATIENT)
Dept: RHEUMATOLOGY | Age: 69
End: 2024-05-13
Payer: MEDICARE

## 2024-05-13 VITALS — WEIGHT: 147 LBS | BODY MASS INDEX: 24.49 KG/M2 | HEIGHT: 65 IN

## 2024-05-13 DIAGNOSIS — Z79.899 HIGH RISK MEDICATION USE: ICD-10-CM

## 2024-05-13 DIAGNOSIS — M06.09 RHEUMATOID ARTHRITIS OF MULTIPLE SITES WITH NEGATIVE RHEUMATOID FACTOR (HCC): Primary | ICD-10-CM

## 2024-05-13 DIAGNOSIS — D84.9 IMMUNOSUPPRESSION (HCC): ICD-10-CM

## 2024-05-13 PROCEDURE — 99214 OFFICE O/P EST MOD 30 MIN: CPT | Performed by: INTERNAL MEDICINE

## 2024-05-13 PROCEDURE — G2211 COMPLEX E/M VISIT ADD ON: HCPCS | Performed by: INTERNAL MEDICINE

## 2024-05-13 PROCEDURE — 1123F ACP DISCUSS/DSCN MKR DOCD: CPT | Performed by: INTERNAL MEDICINE

## 2024-05-13 ASSESSMENT — ENCOUNTER SYMPTOMS
TROUBLE SWALLOWING: 0
NAUSEA: 0
VOMITING: 0
ABDOMINAL PAIN: 0
COUGH: 0
DIARRHEA: 0
COLOR CHANGE: 0

## 2024-05-13 NOTE — PROGRESS NOTES
Marissa Washington 1955 is a 68 y.o. female, here for evaluation of the following chief complaint(s):  Follow-up (Patient is here today to follow up on RA)      Assessment & Plan   ASSESSMENT/PLAN:    Marissa Washington 1955 is a 68 y.o. female seen in follow-up for rheumatoid arthritis.    1.  Seronegative, erosive rheumatoid arthritis-doing well on Rinvoq.  Last visit she was also on leflunomide but was stopped because of hair loss in the eyebrows.  She has not had any worsening since stopping leflunomide.  I see no synovitis on exam.  I would not make any changes.    2.  Immunosuppression-I recommend she stay up-to-date on vaccinations.  She refuses the COVID vaccine.  She is understanding and accepting of the increased risk of severe Covid infection on Rinvoq.  In the event of any acute infectious illness she should hold Rinvoq and Arava.    3.  High risk medication use-we will monitor blood work every 3 months on Rinvoq.  PCP is following her cholesterol.  We will monitor for infection.    4.  Elevated ACE level-she does have some shortness of breath.  Chest x-ray is normal.    5.  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  On omeprazole.  Avoid systemic NSAIDs.  She is unable to tolerate topical NSAIDs either.    6.  Chronic neck and back pain-rheumatoid arthritis spares the back and very rarely affects the AA joint in the neck.  I suspect this is all a separate issue.  She is following with pain management.  She had a spinal cord stimulator placed.  She is on Lyrica.    1. Rheumatoid arthritis of multiple sites with negative rheumatoid factor (HCC)  -     CBC with Auto Differential; Standing  -     ALT; Standing  -     AST; Standing  -     Creatinine; Standing  -     Sedimentation Rate; Standing  -     C-Reactive Protein; Standing  2. Immunosuppression (HCC)  -     CBC with Auto Differential; Standing  -     ALT; Standing  -     AST; Standing  -     Creatinine; Standing  -     
Statement Selected

## 2024-07-08 ENCOUNTER — ANESTHESIA EVENT (OUTPATIENT)
Dept: OPERATING ROOM | Age: 69
End: 2024-07-08
Payer: MEDICARE

## 2024-07-08 ASSESSMENT — LIFESTYLE VARIABLES: SMOKING_STATUS: 0

## 2024-07-08 NOTE — ANESTHESIA PRE PROCEDURE
Department of Anesthesiology  Preprocedure Note       Name:  Marissa Washington   Age:  68 y.o.  :  1955                                          MRN:  [id number]         Date:  2024      Surgeon: Surgeon(s):  Tabby Baldwin DO    Procedure: Procedure(s):  LEFT CERVICAL C7-T1 T1-T2  RADIOFREQUENCY ABLATION UNDER XRAY WITH SEDATION    Medications prior to admission:   Prior to Admission medications    Medication Sig Start Date End Date Taking? Authorizing Provider   Krill Oil 300 MG CAPS Take by mouth   Yes Thao Schulz MD   pregabalin (LYRICA) 75 MG capsule Take 1 capsule by mouth 2 times daily. 3/19/24   Thao Schulz MD   omeprazole (PRILOSEC) 40 MG delayed release capsule  24   Thao Schulz MD   Upadacitinib ER (RINVOQ) 15 MG TB24 Take 15 mg by mouth daily 23   Swapnil Harrell DO   NONFORMULARY Cholestoff - cholesterol supplement OTC, patient takes one tablet daily    Thao Schulz MD   Biotin 91370 MCG TABS Take 1 tablet by mouth daily    Thao Schulz MD   b complex vitamins capsule Take 1 capsule by mouth daily    Thao Schulz MD   Probiotic Product (PRO-BIOTIC BLEND PO) Take 1 tablet by mouth daily    Thao Schulz MD   ketoconazole (NIZORAL) 2 % cream Apply topically as needed Apply topically daily.    Thao Schulz MD   traMADol (ULTRAM) 50 MG tablet Take 1 tablet by mouth 2 times daily as needed for Pain (break through pain).    Thao Schulz MD   traMADol (ULTRAM ER) 200 MG extended release tablet Take 1 tablet by mouth daily.    Thao Schulz MD   lidocaine (XYLOCAINE) 5 % ointment Apply topically as needed for pain up to 3 times a day to affected area. 18   Debbie Eaton MD   lidocaine (LIDODERM) 5 % APPLY three PATCH's TO THREE AFFECTED AREAS 12 HOURS ON 12 HOURS OFF. 18  Debbie Eaton MD   Turmeric Curcumin 500 MG CAPS Take 1 capsule by mouth    Thao Schulz MD

## 2024-07-10 ENCOUNTER — HOSPITAL ENCOUNTER (OUTPATIENT)
Dept: OPERATING ROOM | Age: 69
Setting detail: OUTPATIENT SURGERY
Discharge: HOME | End: 2024-07-10
Attending: ANESTHESIOLOGY
Payer: MEDICARE

## 2024-07-10 ENCOUNTER — HOSPITAL ENCOUNTER (OUTPATIENT)
Age: 69
Setting detail: OUTPATIENT SURGERY
Discharge: HOME | End: 2024-07-10
Attending: ANESTHESIOLOGY | Admitting: ANESTHESIOLOGY
Payer: MEDICARE

## 2024-07-10 ENCOUNTER — ANESTHESIA (OUTPATIENT)
Dept: OPERATING ROOM | Age: 69
End: 2024-07-10
Payer: MEDICARE

## 2024-07-10 VITALS
BODY MASS INDEX: 25.16 KG/M2 | WEIGHT: 151 LBS | TEMPERATURE: 98 F | RESPIRATION RATE: 20 BRPM | HEIGHT: 65 IN | SYSTOLIC BLOOD PRESSURE: 109 MMHG | DIASTOLIC BLOOD PRESSURE: 56 MMHG | OXYGEN SATURATION: 95 % | HEART RATE: 75 BPM

## 2024-07-10 DIAGNOSIS — M51.24 OTHER INTERVERTEBRAL DISC DISPLACEMENT, THORACIC REGION: ICD-10-CM

## 2024-07-10 PROCEDURE — 3700000000 HC ANESTHESIA ATTENDED CARE: Performed by: ANESTHESIOLOGY

## 2024-07-10 PROCEDURE — 6360000002 HC RX W HCPCS: Performed by: ANESTHESIOLOGY

## 2024-07-10 PROCEDURE — 2580000003 HC RX 258: Performed by: ANESTHESIOLOGY

## 2024-07-10 PROCEDURE — 6360000002 HC RX W HCPCS: Performed by: NURSE ANESTHETIST, CERTIFIED REGISTERED

## 2024-07-10 PROCEDURE — 3600000005 HC SURGERY LEVEL 5 BASE: Performed by: ANESTHESIOLOGY

## 2024-07-10 PROCEDURE — 2500000003 HC RX 250 WO HCPCS: Performed by: ANESTHESIOLOGY

## 2024-07-10 PROCEDURE — 2709999900 HC NON-CHARGEABLE SUPPLY: Performed by: ANESTHESIOLOGY

## 2024-07-10 PROCEDURE — 7100000011 HC PHASE II RECOVERY - ADDTL 15 MIN: Performed by: ANESTHESIOLOGY

## 2024-07-10 PROCEDURE — 7100000010 HC PHASE II RECOVERY - FIRST 15 MIN: Performed by: ANESTHESIOLOGY

## 2024-07-10 RX ORDER — MIDAZOLAM HYDROCHLORIDE 1 MG/ML
INJECTION INTRAMUSCULAR; INTRAVENOUS PRN
Status: DISCONTINUED | OUTPATIENT
Start: 2024-07-10 | End: 2024-07-10 | Stop reason: SDUPTHER

## 2024-07-10 RX ORDER — FENTANYL CITRATE 50 UG/ML
INJECTION, SOLUTION INTRAMUSCULAR; INTRAVENOUS PRN
Status: DISCONTINUED | OUTPATIENT
Start: 2024-07-10 | End: 2024-07-10 | Stop reason: SDUPTHER

## 2024-07-10 RX ORDER — LIDOCAINE HYDROCHLORIDE 20 MG/ML
INJECTION, SOLUTION EPIDURAL; INFILTRATION; INTRACAUDAL; PERINEURAL PRN
Status: DISCONTINUED | OUTPATIENT
Start: 2024-07-10 | End: 2024-07-10 | Stop reason: ALTCHOICE

## 2024-07-10 RX ORDER — SODIUM CHLORIDE, SODIUM LACTATE, POTASSIUM CHLORIDE, CALCIUM CHLORIDE 600; 310; 30; 20 MG/100ML; MG/100ML; MG/100ML; MG/100ML
INJECTION, SOLUTION INTRAVENOUS CONTINUOUS
Status: DISCONTINUED | OUTPATIENT
Start: 2024-07-10 | End: 2024-07-10 | Stop reason: HOSPADM

## 2024-07-10 RX ORDER — LIDOCAINE HYDROCHLORIDE 10 MG/ML
INJECTION, SOLUTION EPIDURAL; INFILTRATION; INTRACAUDAL; PERINEURAL PRN
Status: DISCONTINUED | OUTPATIENT
Start: 2024-07-10 | End: 2024-07-10 | Stop reason: ALTCHOICE

## 2024-07-10 RX ADMIN — FENTANYL CITRATE 50 MCG: 50 INJECTION, SOLUTION INTRAMUSCULAR; INTRAVENOUS at 09:58

## 2024-07-10 RX ADMIN — CEFAZOLIN 2000 MG: 2 INJECTION, POWDER, FOR SOLUTION INTRAMUSCULAR; INTRAVENOUS at 09:59

## 2024-07-10 RX ADMIN — SODIUM CHLORIDE, POTASSIUM CHLORIDE, SODIUM LACTATE AND CALCIUM CHLORIDE: 600; 310; 30; 20 INJECTION, SOLUTION INTRAVENOUS at 09:23

## 2024-07-10 RX ADMIN — FENTANYL CITRATE 50 MCG: 50 INJECTION, SOLUTION INTRAMUSCULAR; INTRAVENOUS at 09:57

## 2024-07-10 RX ADMIN — MIDAZOLAM 2 MG: 1 INJECTION INTRAMUSCULAR; INTRAVENOUS at 09:56

## 2024-07-10 ASSESSMENT — PAIN DESCRIPTION - DESCRIPTORS: DESCRIPTORS: ACHING

## 2024-07-10 ASSESSMENT — PAIN - FUNCTIONAL ASSESSMENT
PAIN_FUNCTIONAL_ASSESSMENT: 0-10

## 2024-07-10 NOTE — OP NOTE
Preoperative Diagnoses: Patient Active Problem List:       Protrusion of thoracic intervertebral disc     Herniated thoracic disc without myelopathy     DDD (degenerative disc disease), thoracic          Postoperative Diagnoses: Same     Procedure Performed: Therapeutic radiofrequency neurolysis of the left Thoracic facet/medial branch nerve root under direct fluoroscopic guidance.     Anesthesia: Local with monitored anesthesia care.     Complications:  None.    Estimated blood loss: Less than 5 ml.    Brief History:     Marissa Washington comes in today to [facility] for the above procedure. She was again explained this procedure in great detail including the potential complications and again did request that we proceed.     Marissa has tried multiple attempts at conservative outpatient management including physical therapy, nonsteroidal antiinflammatory medications, analgesics, muscle relaxants, transcutaneous electrical nerve stimulation, manipulation, etc., without much relief.    Her complete History & Physical examination was reviewed and it is unchanged.   Anesthesia was administered per the anesthesia record. The patient was seen by the department of anesthesia. They are required to be present throughout this procedure to provide the patient with adequate analgesia and sedation so that she is not moving during this very delicate procedure, yet at the same time keeping her fully awake and oriented at all times. Please see the anesthesia record for further details.    Description of Procedure:     Marissa was brought to the procedure room at the [facility] and was placed in the prone position and monitored in the usual fashion by the department of anesthesia. The left paraspinal areas of left Thoracic were prepped and draped  in the usual sterile manner. A time-out was performed and confirmed the patient’s name, date of birth, the procedure to be performed and skin

## 2024-07-10 NOTE — DISCHARGE INSTRUCTIONS
Post-op instruction Radiofrequency  Dr. Baldwin    [place] office  [phone]  [place] office  [phone]          You may apply an ice pack wrapped in a towel to the sore area. However, do not use ice longer than 10 minutes at time. You will probably have soreness at the site where the endoscope was inserted.    Keep the surgical site clean and dry at all times.    Remove the dressing in 24 hrs and apply a sterile Band-Aid.     Go home and rest. When resting, changing positions frequently may help reduce stiffness and soreness.     The following day you may resume normal activities providing you listen to you body. Your body will tell you how active or inactive to be. Remember the rule of thumb “If it hurts, don’t do it.” Increase you activity level slowly.    Do not drive a motor vehicle, operate machinery or make important decisions for 24 hours.     Resume normal diet and medications.    Call the [facility] at [phone] or your physician’s answering service at [phone] if you experience any of the following symptoms: Fever, redness and or swelling at the site, nausea and vomiting, headache, persistent pain, numbness or tingling of legs and/or feet, and bowel or bladder problems.     You may experience some increased pain during this time for 24-36 hours after the procedure. It can take up to 8 weeks for the procedure to be effective.     Infection After Surgery: Care Instructions  Overview  After surgery, an infection is always possible. It doesn't mean that the surgery didn't go well.  Because an infection can be serious, your doctor has taken steps to manage it.  Your doctor checked the infection and cleaned it if necessary. Your doctor may have made an opening in the area so that the pus can drain out. You may have gauze in the cut so that the area will stay open and keep draining. You may need antibiotics.  You will need to follow up with your doctor to make sure the infection has 
ER Physician: Maryan Moran, DO  CHEST XRAY INTERPRETATION: lungs clear, heart shadow normal, bony structures intact

## 2024-07-10 NOTE — H&P
Update History & Physical    The patient's History and Physical of 06/11/2024 was reviewed with the patient and there were no significant changes.    I examined the patient and there were no significant changes from the previous History and Physical.    Plan: The risk, benefits, expected outcome, and alternative to the recommended procedure have been discussed with the patient.  Patient understands and wants to proceed with the procedure.      Electronically signed by Tabby Baldwin DO on 7/10/24 at 8:40 AM EDT

## 2024-07-10 NOTE — ANESTHESIA POSTPROCEDURE EVALUATION
Department of Anesthesiology  Postprocedure Note    Patient: Marissa Washington  MRN: [id number]  YOB: 1955  Date of evaluation: 7/10/2024    Procedure Summary       Date: 07/10/24 Room / Location: [address]    Anesthesia Start: 0955 Anesthesia Stop: 1012    Procedure: LEFT THORACIC C7-T1 T1-T2  RADIOFREQUENCY ABLATION UNDER XRAY WITH SEDATION (Left: Back) Diagnosis:       Other intervertebral disc displacement, thoracic region      (Other intervertebral disc displacement, thoracic region [M51.24])    Surgeons: Tabby Baldwin DO Responsible Provider: Ambrocio Araujo MD    Anesthesia Type: MAC ASA Status: 3            Anesthesia Type: MAC    Shahab Phase I: Shahab Score: 10    Shahab Phase II: Shahab Score: 10    Anesthesia Post Evaluation    Patient location during evaluation: PACU  Patient participation: complete - patient participated  Level of consciousness: awake and alert  Airway patency: patent  Nausea & Vomiting: no nausea and no vomiting  Cardiovascular status: hemodynamically stable  Respiratory status: acceptable  Hydration status: euvolemic  Multimodal analgesia pain management approach  Pain management: adequate    No notable events documented.

## 2024-08-27 ENCOUNTER — TELEPHONE (OUTPATIENT)
Dept: RHEUMATOLOGY | Age: 69
End: 2024-08-27

## 2024-08-27 NOTE — TELEPHONE ENCOUNTER
Tried to call patient, unable to leave message. Will try to call again.     Electronically signed by Socrates Jasso MA on 8/27/2024 at 1:49 PM

## 2024-08-27 NOTE — TELEPHONE ENCOUNTER
Patient had her lab work done at [facility] on 8/26/24. Results are scanned into chart.     Please review and advise.    Electronically signed by Socrates Jasso MA on 8/27/2024 at 11:29 AM

## 2024-09-03 NOTE — TELEPHONE ENCOUNTER
Call and spoke to patient, verbalized understanding of doctors message about lab results.     Electronically signed by Socrates Jasso MA on 9/3/2024 at 11:49 AM

## 2024-11-15 ENCOUNTER — TELEMEDICINE (OUTPATIENT)
Dept: RHEUMATOLOGY | Age: 69
End: 2024-11-15

## 2024-11-15 DIAGNOSIS — M54.16 LUMBAR RADICULOPATHY: Chronic | ICD-10-CM

## 2024-11-15 DIAGNOSIS — M06.09 RHEUMATOID ARTHRITIS OF MULTIPLE SITES WITH NEGATIVE RHEUMATOID FACTOR (HCC): Primary | ICD-10-CM

## 2024-11-15 DIAGNOSIS — D84.9 IMMUNOSUPPRESSION (HCC): ICD-10-CM

## 2024-11-15 DIAGNOSIS — R74.8 ABNORMAL SERUM ACE LEVEL: ICD-10-CM

## 2024-11-15 DIAGNOSIS — Z79.899 HIGH RISK MEDICATION USE: ICD-10-CM

## 2024-11-15 ASSESSMENT — ENCOUNTER SYMPTOMS
BACK PAIN: 1
VOMITING: 0
TROUBLE SWALLOWING: 0
COUGH: 0
NAUSEA: 0
ABDOMINAL PAIN: 0
DIARRHEA: 0
COLOR CHANGE: 0

## 2024-11-15 NOTE — PROGRESS NOTES
Marissa Washington, was evaluated through a synchronous (real-time) audio-video encounter. The patient (or guardian if applicable) is aware that this is a billable service, which includes applicable co-pays. This Virtual Visit was conducted with patient's (and/or legal guardian's) consent. Patient identification was verified, and a caregiver was present when appropriate.   The patient was located at Home: [address] [place] [address] [address] [place] [address]  Provider was located at Home (Appt Dept State): [place]  Confirm you are appropriately licensed, registered, or certified to deliver care in the state where the patient is located as indicated above. If you are not or unsure, please re-schedule the visit: Yes, I confirm.      Total time spent for this encounter: Not billed by time    --Swapnil Harrell,  on 11/15/2024 at 11:57 AM    An electronic signature was used to authenticate this note.     Marissa Washington 1955 is a 68 y.o. female, here for evaluation of the following chief complaint(s):  Follow-up (Patient present for a  VV for follow up on Rheumatoid Arthritis. Doing well with the Rinvoq medication, not having any issues. )      Assessment & Plan   ASSESSMENT/PLAN:    Marissa Washington 1955 is a 68 y.o. female seen in follow-up for rheumatoid arthritis.    1.  Seronegative, erosive rheumatoid arthritis-continues to do well on Rinvoq.  She was previously on leflunomide but was having hair loss so that was stopped and she is continued to do just as well without it.  She denies any significantly painful or swollen peripheral joints.  I would not make any changes.    2.  Immunosuppression-I recommend she stay up-to-date on vaccinations.  In the event of any acute infectious illness she should hold Rinvoq.    3.  High risk medication use-we will monitor blood work every 3 months on Rinvoq.  PCP is following her cholesterol.  We will monitor for infection.    4.  Elevated ACE level-she does have some shortness of breath.

## 2024-11-20 DIAGNOSIS — M06.09 RHEUMATOID ARTHRITIS OF MULTIPLE SITES WITH NEGATIVE RHEUMATOID FACTOR (HCC): Primary | ICD-10-CM

## 2024-11-20 DIAGNOSIS — D84.9 IMMUNOSUPPRESSION (HCC): ICD-10-CM

## 2024-11-20 RX ORDER — UPADACITINIB 15 MG/1
15 TABLET, EXTENDED RELEASE ORAL DAILY
Qty: 30 TABLET | Refills: 11 | Status: SHIPPED | OUTPATIENT
Start: 2024-11-20 | End: 2024-11-20 | Stop reason: SDUPTHER

## 2024-11-20 RX ORDER — UPADACITINIB 15 MG/1
15 TABLET, EXTENDED RELEASE ORAL DAILY
Qty: 30 TABLET | Refills: 11 | Status: SHIPPED | OUTPATIENT
Start: 2024-11-20

## 2024-11-20 NOTE — TELEPHONE ENCOUNTER
Renewing patient's PAP application for the 2025 year.    Rinvoq rx is pended for print.           Electronically signed by Tracey Tang CMA on 11/20/2024 at 11:27 AM

## 2024-12-02 ENCOUNTER — TELEPHONE (OUTPATIENT)
Dept: RHEUMATOLOGY | Age: 69
End: 2024-12-02

## 2024-12-02 NOTE — TELEPHONE ENCOUNTER
Patient's application for the Rinvoq PAP has been approved and is valid till 12/31/2025.    Approval notification is scanned in chart.      Called and spoke with patient, she is notified of the approval.    Electronically signed by Tracey Tang CMA on 12/2/2024 at 3:24 PM

## 2025-05-19 ENCOUNTER — OFFICE VISIT (OUTPATIENT)
Dept: RHEUMATOLOGY | Age: 70
End: 2025-05-19
Payer: MEDICARE

## 2025-05-19 VITALS — BODY MASS INDEX: 25.16 KG/M2 | HEIGHT: 65 IN | WEIGHT: 151 LBS

## 2025-05-19 DIAGNOSIS — R74.8 ABNORMAL SERUM ACE LEVEL: ICD-10-CM

## 2025-05-19 DIAGNOSIS — M51.26 PROTRUDED LUMBAR DISC: Chronic | ICD-10-CM

## 2025-05-19 DIAGNOSIS — D84.9 IMMUNOSUPPRESSION: ICD-10-CM

## 2025-05-19 DIAGNOSIS — M47.812 CERVICAL SPONDYLOSIS: Chronic | ICD-10-CM

## 2025-05-19 DIAGNOSIS — Z79.899 HIGH RISK MEDICATION USE: ICD-10-CM

## 2025-05-19 DIAGNOSIS — M06.09 RHEUMATOID ARTHRITIS OF MULTIPLE SITES WITH NEGATIVE RHEUMATOID FACTOR (HCC): Primary | ICD-10-CM

## 2025-05-19 PROCEDURE — 99214 OFFICE O/P EST MOD 30 MIN: CPT | Performed by: INTERNAL MEDICINE

## 2025-05-19 PROCEDURE — 1123F ACP DISCUSS/DSCN MKR DOCD: CPT | Performed by: INTERNAL MEDICINE

## 2025-05-19 PROCEDURE — G2211 COMPLEX E/M VISIT ADD ON: HCPCS | Performed by: INTERNAL MEDICINE

## 2025-05-19 PROCEDURE — 1159F MED LIST DOCD IN RCRD: CPT | Performed by: INTERNAL MEDICINE

## 2025-05-19 ASSESSMENT — ENCOUNTER SYMPTOMS
DIARRHEA: 0
ABDOMINAL PAIN: 0
COUGH: 0
COLOR CHANGE: 0
VOMITING: 0
BACK PAIN: 1
TROUBLE SWALLOWING: 0
NAUSEA: 0

## 2025-05-19 NOTE — PROGRESS NOTES
No swelling.      Comments: She has Heberden's nodes.  No synovitis.  She is tender over both greater trochanters.   Skin:     Findings: No rash.   Neurological:      General: No focal deficit present.      Mental Status: She is alert and oriented to person, place, and time. Mental status is at baseline.   Psychiatric:         Mood and Affect: Mood normal.         Behavior: Behavior normal.            Lab Results   Component Value Date    WBC 4.1 (L) 04/09/2024    HGB 12.2 04/09/2024    HCT 35.9 04/09/2024    MCV 93.7 04/09/2024     04/09/2024     Lab Results   Component Value Date     06/12/2023    K 4.1 06/12/2023     06/12/2023    CO2 26 06/12/2023    BUN 18 06/12/2023    CREATININE 0.8 06/12/2023    GLUCOSE 120 (H) 06/12/2023    CALCIUM 9.1 06/12/2023    BILITOT 0.3 06/12/2023    ALKPHOS 101 06/12/2023    AST 27 06/12/2023    ALT 20 06/12/2023    LABGLOM >60 06/12/2023    GFRAA >60 04/07/2022     No results found for: \"NATALEE\"  No components found for: \"RHEUMFACTOR\"  Lab Results   Component Value Date    SEDRATE 2 04/24/2023     Lab Results   Component Value Date    CRP <0.3 04/24/2023            An electronic signature was used to authenticate this note.    This note was generated with a voice recognition dictation system. Please disregard any errors or omission which have escaped my review.    --Swapnil Harrell DO

## 2025-07-10 ENCOUNTER — TELEPHONE (OUTPATIENT)
Dept: RHEUMATOLOGY | Age: 70
End: 2025-07-10

## 2025-07-10 NOTE — TELEPHONE ENCOUNTER
Patient had blood work done at [facility] [place] on 07/09/2025.    Results are scanned in chart.    Please review and advise further instructions.      Electronically signed by Tracey Tang CMA on 7/10/2025 at 8:30 AM

## 2025-07-22 ENCOUNTER — TRANSCRIBE ORDERS (OUTPATIENT)
Dept: ADMINISTRATIVE | Age: 70
End: 2025-07-22

## 2025-07-22 DIAGNOSIS — M54.16 LUMBAR RADICULOPATHY: ICD-10-CM

## 2025-07-22 DIAGNOSIS — M47.814 SPONDYLOSIS OF THORACIC REGION WITHOUT MYELOPATHY OR RADICULOPATHY: Primary | ICD-10-CM

## 2025-07-22 DIAGNOSIS — M54.12 RADICULOPATHY, CERVICAL: ICD-10-CM

## 2025-08-25 ENCOUNTER — HOSPITAL ENCOUNTER (OUTPATIENT)
Dept: MRI IMAGING | Age: 70
Discharge: HOME | End: 2025-08-27
Payer: MEDICARE

## 2025-08-25 DIAGNOSIS — M54.16 LUMBAR RADICULOPATHY: ICD-10-CM

## 2025-08-25 DIAGNOSIS — M54.12 RADICULOPATHY, CERVICAL: ICD-10-CM

## 2025-08-25 PROCEDURE — 72148 MRI LUMBAR SPINE W/O DYE: CPT

## 2025-08-25 PROCEDURE — 72141 MRI NECK SPINE W/O DYE: CPT

## 2025-08-26 ENCOUNTER — HOSPITAL ENCOUNTER (OUTPATIENT)
Dept: MRI IMAGING | Age: 70
Discharge: HOME | End: 2025-08-28
Payer: MEDICARE

## 2025-08-26 DIAGNOSIS — M47.814 SPONDYLOSIS OF THORACIC REGION WITHOUT MYELOPATHY OR RADICULOPATHY: ICD-10-CM

## 2025-08-26 PROCEDURE — 72146 MRI CHEST SPINE W/O DYE: CPT

## 2025-09-03 DIAGNOSIS — M06.09 RHEUMATOID ARTHRITIS OF MULTIPLE SITES WITH NEGATIVE RHEUMATOID FACTOR (HCC): ICD-10-CM

## 2025-09-03 DIAGNOSIS — D84.9 IMMUNOSUPPRESSION: ICD-10-CM

## 2025-09-03 RX ORDER — UPADACITINIB 15 MG/1
15 TABLET, EXTENDED RELEASE ORAL DAILY
Qty: 30 TABLET | Refills: 11 | Status: ACTIVE | OUTPATIENT
Start: 2025-09-03

## (undated) DEVICE — Device

## (undated) DEVICE — CONTROL SYRINGE LUER-LOCK TIP: Brand: MONOJECT

## (undated) DEVICE — TOWEL OR BLUEE 16X26IN ST 8 PACK ORB08 16X26ORTWL

## (undated) DEVICE — STERILE POLYISOPRENE POWDER-FREE SURGICAL GLOVES: Brand: PROTEXIS

## (undated) DEVICE — CHLORAPREP 26ML ORANGE

## (undated) DEVICE — SUTURE PERMA-HAND SZ 0 L30IN NONABSORBABLE BLK L36MM CT-1 424H

## (undated) DEVICE — TOWEL,OR,DSP,ST,BLUE,STD,6/PK,12PK/CS: Brand: MEDLINE

## (undated) DEVICE — GLOVE RAD REDUC 8.5 IN

## (undated) DEVICE — GAUZE,SPONGE,4"X4",16PLY,STRL,LF,10/TRAY: Brand: MEDLINE

## (undated) DEVICE — Z INACTIVE NO ACTIVE SUPPLIER APPLICATOR MEDICATED 26 CC TINT HI-LITE ORNG STRL CHLORAPREP

## (undated) DEVICE — SYRINGE MED 20ML STD CLR PLAS LUERLOCK TIP N CTRL DISP

## (undated) DEVICE — BANDAGE ADH W1XL3IN NAT FAB WVN FLX DURABLE N ADH PD SEAL

## (undated) DEVICE — 1810 FOAM BLOCK NEEDLE COUNTER: Brand: DEVON

## (undated) DEVICE — ELECTRODE SURG MPLR NEUT SELF ADH PT PLT MULTIGEN

## (undated) DEVICE — INTENDED FOR TISSUE SEPARATION, AND OTHER PROCEDURES THAT REQUIRE A SHARP SURGICAL BLADE TO PUNCTURE OR CUT.: Brand: BARD-PARKER ® STAINLESS STEEL BLADES

## (undated) DEVICE — 12 ML SYRINGE,LUER-LOCK TIP: Brand: MONOJECT

## (undated) DEVICE — SUTURE PERMA-HAND SZ 1 L30IN NONABSORBABLE BLK SH L26MM 1/2 K835H

## (undated) DEVICE — GOWN SURG XL SMS FAB NONREINFORCED RAGLAN SLV HK LOOP CLSR

## (undated) DEVICE — DEVICE NEUROSTIMULATOR W2.9XL3.1IN THK0.8IN 71GM

## (undated) DEVICE — NEEDLE HYPO 18GA L1.5IN PNK POLYPR HUB S STL THN WALL FILL

## (undated) DEVICE — 1530S-1, 1 IN X 1.5 YD (2,5 CM X 1,37 M), UNPACKAGED ROLLS, 100 RL/CARTON, 5 CARTONS/CASE, 500 RL/CA: Brand: 3M™ MICROPORE™

## (undated) DEVICE — DRAPE SHEET, X-LARGE: Brand: CONVERTORS

## (undated) DEVICE — HALF SHEET: Brand: CONVERTORS

## (undated) DEVICE — SOLUTION IV IRRIG POUR BRL 0.9% SODIUM CHL 2F7124

## (undated) DEVICE — NEEDLE HYPO 25GA L1.5IN BLU POLYPR HUB S STL REG BVL STR

## (undated) DEVICE — GLOVE SURG SZ 85 L12IN FNGR THK94MIL STD WHT LTX FREE

## (undated) DEVICE — NEEDLE HYPO 30GA L0.5IN BGE POLYPR HUB S STL REG BVL STR

## (undated) DEVICE — Z INACTIVE USE 2855128 SPONGE GZ 16 PLY WVN COT 4INX4IN  HHH

## (undated) DEVICE — MEDI-VAC YANKAUER SUCTION HANDLE W/BULBOUS TIP: Brand: CARDINAL HEALTH

## (undated) DEVICE — 3M™ STERI-STRIP™ REINFORCED ADHESIVE SKIN CLOSURES, R1547, 1/2 IN X 4 IN (12 MM X 100 MM), 6 STRIPS/ENVELOPE: Brand: 3M™ STERI-STRIP™

## (undated) DEVICE — DOUBLE BASIN SET: Brand: MEDLINE INDUSTRIES, INC.

## (undated) DEVICE — SYRINGE BLB 50CC IRRIG PLIABLE FNGR FLNG GRAD FLSK DISP

## (undated) DEVICE — GLOVE ORANGE PI 8 1/2   MSG9085

## (undated) DEVICE — APPLICATOR MEDICATED 26 CC SOLUTION HI LT ORNG CHLORAPREP

## (undated) DEVICE — Z INACTIVE USE 2863041 SPONGE GZ W4XL4IN 100% COT 16 PLY RADPQ HIGHLY ABSRB

## (undated) DEVICE — MEDI-VAC NON-CONDUCTIVE SUCTION TUBING: Brand: CARDINAL HEALTH

## (undated) DEVICE — HANDLE CVR PATENTED RETENTION DISC STRL LIGHT SHLD

## (undated) DEVICE — Z DISCONTINUED PER MEDLINE USE 2741942 DRESSING AQUACEL 6 IN ALG W9XL15CM SIL CVR WTRPRF VIR BACT BARR ANTIMIC

## (undated) DEVICE — UNIVERSAL PACK: Brand: CONVERTORS

## (undated) DEVICE — E-Z CLEAN, NON-STICK, PTFE COATED, ELECTROSURGICAL BLADE ELECTRODE, 2.5 INCH (6.35 CM): Brand: EZ CLEAN

## (undated) DEVICE — NEEDLE SPNL 22GA L5IN BLK HUB S STL W/ QNCKE PNT W/OUT

## (undated) DEVICE — SUTURE MCRYL SZ 3-0 L18IN ABSRB UD L19MM PS-2 3/8 CIR PRIM Y497G

## (undated) DEVICE — SUTURE PERMA-HAND SZ 2-0 L30IN NONABSORBABLE BLK L26MM SH K833H

## (undated) DEVICE — YANKAUER,BULB TIP,W/O VENT,RIGID,STERILE: Brand: MEDLINE

## (undated) DEVICE — SYRINGE IRRIG 60ML SFT PLIABLE BLB EZ TO GRP 1 HND USE W/

## (undated) DEVICE — BLADE CLIPPER GEN PURP NS

## (undated) DEVICE — BINDER ABD UNIV H9IN WAIST 45-62IN E SFT COT PREM 3 PNL

## (undated) DEVICE — SPONGE LAP W18XL18IN WHT COT 4 PLY FLD STRUNG RADPQ DISP ST 2 PER PACK

## (undated) DEVICE — ELECTRODE PT RET AD L9FT HI MOIST COND ADH HYDRGEL CORDED

## (undated) DEVICE — SYRINGE TB 1ML NDL 27GA L0.5IN PLAS W/ SFTY LOK PERM NDL

## (undated) DEVICE — MASTISOL ADHESIVE LIQ 2/3ML

## (undated) DEVICE — PEN: MARKING STD 100/CS: Brand: MEDICAL ACTION INDUSTRIES

## (undated) DEVICE — DRAPE 64X41IN RADIOLOGY C ARM EQUIP STER

## (undated) DEVICE — 3M™ MEDIPORE™ SOFT CLOTH TAPE, 4 INCH X 10 YARDS, 12 ROLLS/CASE, 2964: Brand: 3M™ MEDIPORE™

## (undated) DEVICE — PAD ABD CURITY TENDERSORB 5X9IN

## (undated) DEVICE — E-Z CLEAN, NON-STICK, PTFE COATED, ELECTROSURGICAL BLADE ELECTRODE, 2.75 INCH (7 CM): Brand: MEGADYNE

## (undated) DEVICE — CLOTH SKIN PREP 2% CHG

## (undated) DEVICE — Z DISCONTINUED APPLICATOR SURG PREP 0.35OZ 2% CHG 70% ISO ALC W/ HI LT

## (undated) DEVICE — GAUZE,SPONGE,4"X4",16PLY,XRAY,STRL,LF: Brand: MEDLINE

## (undated) DEVICE — NEEDLE HYPO 18GA L1.5IN PNK POLYPR HUB S STL REG BVL STR

## (undated) DEVICE — NEEDLE SPNL 22GA L3.5IN BLK HUB S STL REG WALL FIT STYL W/

## (undated) DEVICE — Z DISCONTINUED USE 2132709 NEEDLE HYPO 18GA L1.5IN PNK POLYPR HUB S STL THN WALL FILL

## (undated) DEVICE — SCANLAN® SUTURE BOOT™ INSTRUMENT JAW COVERS - ORIGINAL YELLOW, MINI PKG (3 PAIR/CARTRIDGE, 1 CARTRIDGE/PKG): Brand: SCANLAN® SUTURE BOOT™ INSTRUMENT JAW COVERS

## (undated) DEVICE — SUTURE ABSRB BRAID COAT UD CP-2 NO 1 27IN VCRL J871H

## (undated) DEVICE — SPONGE LAP W18XL18IN WHT COT 4 PLY FLD STRUNG RADPQ DISP ST

## (undated) DEVICE — SYSTEM RECHARGING NEUROSTIMULATOR RECHRG BTTRY PK PWR SUPL

## (undated) DEVICE — 3M™ TEGADERM™ TRANSPARENT FILM DRESSING FRAME STYLE, 1628, 6 IN X 8 IN (15 CM X 20 CM), 10/CT 8CT/CASE: Brand: 3M™ TEGADERM™

## (undated) DEVICE — DRESSING FOAM W3.5XL6IN POSTOP STRP GENTLE OPTIFOAM AG

## (undated) DEVICE — BINDER ABD UNISX 9IN 62IN L AND XL UNIV

## (undated) DEVICE — DRAPE CAM W7XL96IN W/ BLU KRATON TIP FOR LSR VID

## (undated) DEVICE — SUTURE VCRL SZ 1 L36IN ABSRB UD L36MM CT-1 1/2 CIR J947H

## (undated) DEVICE — SYSTEM DRUG DEL PERS THER MGR MYPTM

## (undated) DEVICE — APPLICATOR MEDICATED 26 CC TINT HI-LITE ORNG STRL CHLORAPREP

## (undated) DEVICE — GAUZE,SPONGE,4"X4",12PLY,STERILE,LF,2'S: Brand: MEDLINE

## (undated) DEVICE — CATHETER 8591-38 PASSER,38CM

## (undated) DEVICE — PENCIL ELECSURG 9.5 MMX3 M 22 MM MOLD PLUMEPEN ELITE

## (undated) DEVICE — NEPTUNE E-SEP SMOKE EVACUATION PENCIL, COATED, 70MM BLADE, PUSH BUTTON SWITCH: Brand: NEPTUNE E-SEP

## (undated) DEVICE — CONTROLLER INTELLIS PTM NONTRAIL